# Patient Record
Sex: MALE
[De-identification: names, ages, dates, MRNs, and addresses within clinical notes are randomized per-mention and may not be internally consistent; named-entity substitution may affect disease eponyms.]

---

## 2017-04-09 ENCOUNTER — HOSPITAL ENCOUNTER (EMERGENCY)
Dept: HOSPITAL 5 - ED | Age: 62
Discharge: HOME | End: 2017-04-09
Payer: MEDICAID

## 2017-04-09 VITALS — DIASTOLIC BLOOD PRESSURE: 79 MMHG | SYSTOLIC BLOOD PRESSURE: 140 MMHG

## 2017-04-09 DIAGNOSIS — I10: ICD-10-CM

## 2017-04-09 DIAGNOSIS — G89.29: ICD-10-CM

## 2017-04-09 DIAGNOSIS — F32.9: ICD-10-CM

## 2017-04-09 DIAGNOSIS — F41.9: ICD-10-CM

## 2017-04-09 DIAGNOSIS — M19.90: ICD-10-CM

## 2017-04-09 DIAGNOSIS — M25.562: ICD-10-CM

## 2017-04-09 DIAGNOSIS — F43.10: ICD-10-CM

## 2017-04-09 DIAGNOSIS — M25.561: Primary | ICD-10-CM

## 2017-04-09 PROCEDURE — 72100 X-RAY EXAM L-S SPINE 2/3 VWS: CPT

## 2017-04-09 PROCEDURE — 99284 EMERGENCY DEPT VISIT MOD MDM: CPT

## 2017-04-09 NOTE — EMERGENCY DEPARTMENT REPORT
ED Lower Extremity HPI





- General


Chief Complaint: Extremity Injury, Lower


Stated Complaint: KNEE PAIN/BACK /HEADACHE


Time Seen by Provider: 04/09/17 15:21


Source: patient, EMS


Mode of arrival: Stretcher


Limitations: No Limitations





- History of Present Illness


MD Complaint: knee injury


Onset/Timing: 10


-: Gradual, year(s)


Injury: Knee: Right, Left


Type of Injury: hyperflexion


Place: home


Severity: moderate


Severity scale (0 -10): 5


Improves With: NSAID


Worsens With: weight bearing, movement


Associated Symptoms: able to partially bear weight, ambulatory.  denies: snap/

pop sensation, swelling, numbness, tingling


Treatments Prior to Arrival: cold therapy





- Related Data


 Home Medications











 Medication  Instructions  Recorded  Confirmed  Last Taken


 


diphenhydrAMINE [Benadryl] 100 mg PO PRN PRN 09/20/13 05/18/15 3 Days Ago


 


clonazePAM 2 mg PO BID 04/27/15 05/18/15 3 Days Ago


 


clonazePAM [KlonoPIN] 2 mg PO BID 04/27/15 05/18/15 3 Days Ago








 Previous Rx's











 Medication  Instructions  Recorded  Last Taken  Type


 


LORazepam [Ativan] 2 mg PO Q8HR #6 tablet 03/19/15 3 Days Ago Rx


 


Bupropion HCl [Wellbutrin XL] 300 mg PO QAM #30 tab.er.24h 03/29/15 3 Days Ago 

Rx


 


Venlafaxine Xr [Effexor XR] 150 mg PO ONCE #30 capsule 03/29/15 3 Days Ago Rx


 


Zolpidem [Ambien] 10 mg PO QHS #10 tablet 03/29/15 3 Days Ago Rx


 


traMADol [Ultram 50 MG tab] 50 mg PO Q6HR PRN #30 tablet 03/06/17 Unknown Rx


 


HYDROcodone/APAP  [Norco 1 each PO Q8HR PRN #15 tablet 04/09/17 Unknown Rx





 mg TAB]    











 Allergies











Allergy/AdvReac Type Severity Reaction Status Date / Time


 


buspirone HCl [From BuSpar] AdvReac  PSYCHOTIC Verified 03/06/17 17:18


 


olanzapine [From Zyprexa] AdvReac  PSYCHOTIC Verified 03/06/17 17:18


 


quetiapine fumarate AdvReac  PSYCHOTIC Verified 03/06/17 17:18





[From Seroquel]     














ED Review of Systems


ROS: 


Stated complaint: KNEE PAIN/BACK /HEADACHE


Other details as noted in HPI





Constitutional: denies: chills, fever


Eyes: denies: eye pain, eye discharge, vision change


ENT: denies: ear pain, throat pain


Respiratory: denies: cough, shortness of breath, wheezing


Cardiovascular: denies: chest pain, palpitations


Endocrine: no symptoms reported


Gastrointestinal: denies: abdominal pain, nausea, diarrhea


Genitourinary: denies: urgency, dysuria


Musculoskeletal: denies: back pain, joint swelling, arthralgia


Skin: denies: rash, lesions


Neurological: denies: headache, weakness, paresthesias


Psychiatric: denies: anxiety, depression


Hematological/Lymphatic: denies: easy bleeding, easy bruising





ED Past Medical Hx





- Past Medical History


Hx Hypertension: Yes


Hx Arthritis: Yes


Hx Psychiatric Treatment: Yes (anxiety, ptsd, depression)


Additional medical history: CHRONIC BACK PAIN.  HARD OF HEARING.  BULGING DISCS





- Surgical History


Additional Surgical History: L leg surgery (1995)





- Social History


Smoking Status: Never Smoker


Substance Use Type: None





- Medications


Home Medications: 


 Home Medications











 Medication  Instructions  Recorded  Confirmed  Last Taken  Type


 


diphenhydrAMINE [Benadryl] 100 mg PO PRN PRN 09/20/13 05/18/15 3 Days Ago 

History


 


LORazepam [Ativan] 2 mg PO Q8HR #6 tablet 03/19/15 05/18/15 3 Days Ago Rx


 


Bupropion HCl [Wellbutrin XL] 300 mg PO QAM #30 tab.er.24h 03/29/15 05/18/15 3 

Days Ago Rx


 


Venlafaxine Xr [Effexor XR] 150 mg PO ONCE #30 capsule 03/29/15 05/18/15 3 Days 

Ago Rx


 


Zolpidem [Ambien] 10 mg PO QHS #10 tablet 03/29/15 05/18/15 3 Days Ago Rx


 


clonazePAM 2 mg PO BID 04/27/15 05/18/15 3 Days Ago History


 


clonazePAM [KlonoPIN] 2 mg PO BID 04/27/15 05/18/15 3 Days Ago History


 


traMADol [Ultram 50 MG tab] 50 mg PO Q6HR PRN #30 tablet 03/06/17  Unknown Rx


 


HYDROcodone/APAP  [Norco 1 each PO Q8HR PRN #15 tablet 04/09/17  Unknown 

Rx





 mg TAB]     














ED Physical Exam





- General


Limitations: No Limitations


General appearance: alert, in no apparent distress





- Head


Head exam: Present: atraumatic, normocephalic





- Eye


Eye exam: Present: normal appearance





- ENT


ENT exam: Present: mucous membranes moist





- Neck


Neck exam: Present: normal inspection





- Respiratory


Respiratory exam: Present: normal lung sounds bilaterally.  Absent: respiratory 

distress





- Cardiovascular


Cardiovascular Exam: Present: regular rate, normal rhythm.  Absent: systolic 

murmur, diastolic murmur, rubs, gallop





- GI/Abdominal


GI/Abdominal exam: Present: soft, normal bowel sounds





- Rectal


Rectal exam: Present: deferred





- Extremities Exam


Extremities exam: Present: normal inspection, full ROM, tenderness (bilateral 

patella tendon tenderness , good rom but limited due to pain , able to ambulate 

here in the ER), normal capillary refill.  Absent: pedal edema, joint swelling, 

calf tenderness





- Back Exam


Back exam: Present: normal inspection





- Neurological Exam


Neurological exam: Present: alert, oriented X3





- Psychiatric


Psychiatric exam: Present: normal affect, normal mood





- Skin


Skin exam: Present: warm, dry, intact, normal color.  Absent: rash





ED Course


 Vital Signs











  04/09/17 04/09/17





  14:58 15:54


 


Temperature 98.3 F 


 


Pulse Rate 88 


 


Respiratory 16 20





Rate  


 


Blood Pressure 140/79 


 


O2 Sat by Pulse 99 





Oximetry  











Critical care attestation.: 


If time is entered above; I have spent that time in minutes in the direct care 

of this critically ill patient, excluding procedure time.








ED Disposition


Clinical Impression: 


 Knee pain





Disposition: DISCHARGED TO HOME OR SELFCARE


Is pt being admited?: No


Does the pt Need Aspirin: No


Condition: Good


Instructions:  Lumbar Radiculopathy (ED), Arthralgia (ED)


Prescriptions: 


HYDROcodone/APAP  [Norco  mg TAB] 1 each PO Q8HR PRN #15 tablet


 PRN Reason: Pain


Referrals: 


[facility]ARE,MD [Primary Care Provider] - 3-5 Days


LALITA ROSARIO MD [Staff Physician] - 3-5 Days


Time of Disposition: 17:36

## 2017-04-10 NOTE — XRAY REPORT
BILATERAL KNEES, 3 VIEWS:



History: Bilateral knee pain after fall.



Findings: Normal bone mineralization. No acute osseous injury is 

appreciated. The joint space is within normal limits. No joint 

effusions are identified.



Within the right knee, there is an approximate 3 mm radiodensity in the 

anterior soft tissues just superior to the patella. This could 

represent a foreign body. Please correlate with the patient and the 

image.



Impression:

Unremarkable bilateral knees.

Possible soft tissue foreign body as described above. It is unclear if 

this is acute or chronic.

## 2017-04-10 NOTE — XRAY REPORT
LUMBOSACRAL SPINE, 3 VIEWS:



History: Back pain



Findings: The vertebral bodies, disk spaces and posterior elements are 

intact.  No compression deformity or malalignment. Mild degenerative 

disc disease and facet arthropathy are identified at all levels. The SI 

joints are symmetric and unremarkable.



Impression:

1.  No evidence for acute injury to the lumbar spine.

## 2017-04-16 ENCOUNTER — HOSPITAL ENCOUNTER (EMERGENCY)
Dept: HOSPITAL 5 - ED | Age: 62
Discharge: HOME | End: 2017-04-16
Payer: MEDICAID

## 2017-04-16 VITALS — DIASTOLIC BLOOD PRESSURE: 105 MMHG | SYSTOLIC BLOOD PRESSURE: 144 MMHG

## 2017-04-16 DIAGNOSIS — F17.200: ICD-10-CM

## 2017-04-16 DIAGNOSIS — F32.9: ICD-10-CM

## 2017-04-16 DIAGNOSIS — I10: ICD-10-CM

## 2017-04-16 DIAGNOSIS — F41.9: ICD-10-CM

## 2017-04-16 DIAGNOSIS — M19.90: ICD-10-CM

## 2017-04-16 DIAGNOSIS — M54.41: Primary | ICD-10-CM

## 2017-04-16 DIAGNOSIS — G89.29: ICD-10-CM

## 2017-04-16 LAB
BILIRUB UR QL STRIP: (no result)
BLOOD UR QL VISUAL: (no result)
KETONES UR STRIP-MCNC: (no result) MG/DL
LEUKOCYTE ESTERASE UR QL STRIP: (no result)
MUCOUS THREADS #/AREA URNS HPF: (no result) /HPF
NITRITE UR QL STRIP: (no result)
PH UR STRIP: 7 [PH] (ref 5–7)
PROT UR STRIP-MCNC: (no result) MG/DL
RBC #/AREA URNS HPF: 3 /HPF (ref 0–6)
UROBILINOGEN UR-MCNC: 4 MG/DL (ref ?–2)
WBC #/AREA URNS HPF: 1 /HPF (ref 0–6)

## 2017-04-16 PROCEDURE — 99283 EMERGENCY DEPT VISIT LOW MDM: CPT

## 2017-04-16 PROCEDURE — 81001 URINALYSIS AUTO W/SCOPE: CPT

## 2017-04-16 NOTE — EMERGENCY DEPARTMENT REPORT
HPI





- General


Chief Complaint: Extremity Problem,Nontraumatic


Time Seen by Provider: 04/16/17 11:08





- HPI


HPI: 





61-year-old male with history of chronic back pain presents today with 

worsening lower back pain radiating down his leg.  Patient was seen here last 

week for similar symptoms, an x-ray was performed and revealed no acute 

findings.  Patient is also complaining of numbness going down his right leg.  

Patient was unable to follow up with orthopedic due to them not accepting his 

medicaid.  Denies any new or worsening symptoms.  Denies bowel or bladder 

incontinence.  Patient does complain of weak stream and dribbling post 

urination 2 weeks.  Denies fever, chills, nausea, vomiting, chest pain, 

shortness of breath, abdominal pain.





ED Past Medical Hx





- Past Medical History


Previous Medical History?: Yes


Hx Hypertension: Yes


Hx Arthritis: Yes


Hx Psychiatric Treatment: Yes (anxiety, ptsd, depression)


Additional medical history: CHRONIC BACK PAIN.  HARD OF HEARING.  BULGING DISCS





- Surgical History


Past Surgical History?: Yes


Additional Surgical History: L leg surgery (1995)





- Social History


Smoking Status: Current Every Day Smoker


Substance Use Type: Non Opiate Pain, Prescribed





- Medications


Home Medications: 


 Home Medications











 Medication  Instructions  Recorded  Confirmed  Last Taken  Type


 


diphenhydrAMINE [Benadryl] 100 mg PO PRN PRN 09/20/13 05/18/15 3 Days Ago 

History


 


LORazepam [Ativan] 2 mg PO Q8HR #6 tablet 03/19/15 05/18/15 3 Days Ago Rx


 


Bupropion HCl [Wellbutrin XL] 300 mg PO QAM #30 tab.er.24h 03/29/15 05/18/15 3 

Days Ago Rx


 


Venlafaxine Xr [Effexor XR] 150 mg PO ONCE #30 capsule 03/29/15 05/18/15 3 Days 

Ago Rx


 


Zolpidem [Ambien] 10 mg PO QHS #10 tablet 03/29/15 05/18/15 3 Days Ago Rx


 


clonazePAM 2 mg PO BID 04/27/15 05/18/15 3 Days Ago History


 


clonazePAM [KlonoPIN] 2 mg PO BID 04/27/15 05/18/15 3 Days Ago History


 


traMADol [Ultram 50 MG tab] 50 mg PO Q6HR PRN #30 tablet 03/06/17  Unknown Rx


 


Cyclobenzaprine [Flexeril] 10 mg PO TID PRN #20 tablet 04/16/17  Unknown Rx


 


HYDROcodone/APAP  [Norco 1 each PO Q8HR PRN #7 tablet 04/16/17  Unknown Rx





 mg TAB]     


 


Ketorolac [Toradol] 10 mg PO Q6H PRN #20 tablet 04/16/17  Unknown Rx














ED Review of Systems


ROS: 


Stated complaint: NUMBNESS IN BOTH LEGS


Other details as noted in HPI





Constitutional: denies: chills, fever, malaise


Eyes: denies: eye pain


ENT: denies: ear pain, throat pain, congestion


Respiratory: denies: cough, shortness of breath, wheezing


Cardiovascular: denies: chest pain, palpitations


Endocrine: no symptoms reported


Gastrointestinal: denies: abdominal pain, nausea, vomiting


Genitourinary: other (weak stream and dribbling post urination).  denies: 

urgency, dysuria, frequency, hematuria


Musculoskeletal: back pain


Neurological: numbness.  denies: headache, weakness





Physical Exam





- Physical Exam


Vital Signs: 


 Vital Signs











  04/16/17





  10:39


 


Temperature 98.6 F


 


Pulse Rate 98 H


 


Respiratory 20





Rate 


 


Blood Pressure 144/105


 


O2 Sat by Pulse 100





Oximetry 











Physical Exam: 





GENERAL: The patient is well-developed and well-nourished. Patient is in NAD. 





HEAD: Normocephalic.  Atraumatic.  





CHEST/LUNGS: Clear to auscultation throughout. 





HEART/CARDIOVASCULAR: Regular rate and rhythm.  No murmurs, rubs or gallops.





ABDOMEN: Abdomen is soft, nontender. Bowel sounds normoactive. No guarding or 

rebound tenderness. 





EXTREMITIES: Full range of motion.  Peripheral pulses intact. Capillary refill 

less than 2 seconds. 





BACK: Full ROM.  Positive for midline and bilateral paraspinal tenderness of 

the lumbar region.  Positive for tenderness to palpation of right sciatic 

notch.  Negative straight leg raise bilaterally.





NEURO: Alert and oriented x 3. Normal gait.











ED Course


 Vital Signs











  04/16/17





  10:39


 


Temperature 98.6 F


 


Pulse Rate 98 H


 


Respiratory 20





Rate 


 


Blood Pressure 144/105


 


O2 Sat by Pulse 100





Oximetry 














ED Medical Decision Making





- Lab Data





 Vital Signs











  04/16/17





  10:39


 


Temperature 98.6 F


 


Pulse Rate 98 H


 


Respiratory 20





Rate 


 


Blood Pressure 144/105


 


O2 Sat by Pulse 100





Oximetry 








 Lab Results











  04/16/17 Range/Units





  11:30 


 


Urine Color  Yellow  (Yellow)  


 


Urine Turbidity  Clear  (Clear)  


 


Urine pH  7.0  (5.0-7.0)  


 


Ur Specific Gravity  1.014  (1.003-1.030)  


 


Urine Protein  <15 mg/dl  (Negative)  mg/dL


 


Urine Glucose (UA)  Neg  (Negative)  mg/dL


 


Urine Ketones  Neg  (Negative)  mg/dL


 


Urine Blood  Neg  (Negative)  


 


Urine Nitrite  Neg  (Negative)  


 


Urine Bilirubin  Neg  (Negative)  


 


Urine Urobilinogen  4.0  (<2.0)  mg/dL


 


Ur Leukocyte Esterase  Neg  (Negative)  


 


Urine WBC (Auto)  1.0  (0.0-6.0)  /HPF


 


Urine RBC (Auto)  3.0  (0.0-6.0)  /HPF


 


U Epithel Cells (Auto)  < 1.0  (0-13.0)  /HPF


 


Calcium Oxalate Crystal  1+  


 


Urine Mucus  Few  /HPF














- Medical Decision Making





61-year-old male presents today with history of chronic back pain and sciatica 

like symptoms.  Patient will be provided with a referral for orthopedic. 

Patient is in no acute distress at this time.  He will be discharged home and 

is encouraged to follow up with a primary care provider.  He will be sent home 

on Flexeril and tramadol and is encouraged to return to the emergency room for 

any worsening symptoms. 


Critical care attestation.: 


If time is entered above; I have spent that time in minutes in the direct care 

of this critically ill patient, excluding procedure time.








ED Disposition


Clinical Impression: 


Chronic back pain


Qualifiers:


 Back pain location: low back pain Back pain laterality: bilateral Sciatica 

presence: with sciatica Sciatica laterality: sciatica of right side Qualified 

Code(s): M54.41 - Lumbago with sciatica, right side; G89.29 - Other chronic pain





Disposition: DISCHARGED TO HOME OR SELFCARE


Is pt being admited?: No


Does the pt Need Aspirin: No


Condition: Stable


Instructions:  Chronic Back Pain (ED), Sciatica (ED)


Additional Instructions: 


Follow-up with primary care provider and orthopedic.  Return to the emergency 

department if symptoms worsen.


Prescriptions: 


Cyclobenzaprine [Flexeril] 10 mg PO TID PRN #20 tablet


 PRN Reason: Muscle Spasm


HYDROcodone/APAP  [Norco  mg TAB] 1 each PO Q8HR PRN #7 tablet


 PRN Reason: Pain


Ketorolac [Toradol] 10 mg PO Q6H PRN #20 tablet


 PRN Reason: Pain


Referrals: 


[facility] [Provider Group] - 3-5 Days


PRIMARY CARE,MD [Primary Care Provider] - 3-5 Days


TANYA BRANCH MD [Staff Physician] - 3-5 Days


SHAWNEE NIX MD [Staff Physician] - 3-5 Days


NADIA DOUGLAS MD [Staff Physician] - 3-5 Days


Forms:  Work/School Release Form(ED)


Time of Disposition: 13:03

## 2017-04-19 ENCOUNTER — HOSPITAL ENCOUNTER (EMERGENCY)
Dept: HOSPITAL 5 - ED | Age: 62
LOS: 1 days | Discharge: HOME | End: 2017-04-20
Payer: MEDICAID

## 2017-04-19 DIAGNOSIS — R45.851: Primary | ICD-10-CM

## 2017-04-19 DIAGNOSIS — F17.200: ICD-10-CM

## 2017-04-19 DIAGNOSIS — M54.40: ICD-10-CM

## 2017-04-19 DIAGNOSIS — G89.29: ICD-10-CM

## 2017-04-19 DIAGNOSIS — I10: ICD-10-CM

## 2017-04-19 LAB
ALBUMIN SERPL-MCNC: 3.8 G/DL (ref 3.9–5)
ALBUMIN/GLOB SERPL: 1.6 %
ALP SERPL-CCNC: 46 UNITS/L (ref 35–129)
ALT SERPL-CCNC: 11 UNITS/L (ref 7–56)
ANION GAP SERPL CALC-SCNC: 14 MMOL/L
BASOPHILS NFR BLD AUTO: 0.8 % (ref 0–1.8)
BILIRUB SERPL-MCNC: 0.2 MG/DL (ref 0.1–1.2)
BUN SERPL-MCNC: 12 MG/DL (ref 9–20)
BUN/CREAT SERPL: 10 %
CALCIUM SERPL-MCNC: 8.5 MG/DL (ref 8.4–10.2)
CHLORIDE SERPL-SCNC: 104.4 MMOL/L (ref 98–107)
CO2 SERPL-SCNC: 27 MMOL/L (ref 22–30)
EOSINOPHIL NFR BLD AUTO: 1.9 % (ref 0–4.3)
GLUCOSE SERPL-MCNC: 102 MG/DL (ref 75–100)
HCT VFR BLD CALC: 37.1 % (ref 35.5–45.6)
HGB BLD-MCNC: 12.3 GM/DL (ref 11.8–15.2)
MCH RBC QN AUTO: 27 PG (ref 28–32)
MCHC RBC AUTO-ENTMCNC: 33 % (ref 32–34)
MCV RBC AUTO: 82 FL (ref 84–94)
PLATELET # BLD: 222 K/MM3 (ref 140–440)
POTASSIUM SERPL-SCNC: 4.5 MMOL/L (ref 3.6–5)
PROT SERPL-MCNC: 6.2 G/DL (ref 6.3–8.2)
RBC # BLD AUTO: 4.53 M/MM3 (ref 3.65–5.03)
SODIUM SERPL-SCNC: 141 MMOL/L (ref 137–145)
WBC # BLD AUTO: 6.4 K/MM3 (ref 4.5–11)

## 2017-04-19 PROCEDURE — 80053 COMPREHEN METABOLIC PANEL: CPT

## 2017-04-19 PROCEDURE — G0480 DRUG TEST DEF 1-7 CLASSES: HCPCS

## 2017-04-19 PROCEDURE — 99285 EMERGENCY DEPT VISIT HI MDM: CPT

## 2017-04-19 PROCEDURE — 36415 COLL VENOUS BLD VENIPUNCTURE: CPT

## 2017-04-19 PROCEDURE — 93010 ELECTROCARDIOGRAM REPORT: CPT

## 2017-04-19 PROCEDURE — 80307 DRUG TEST PRSMV CHEM ANLYZR: CPT

## 2017-04-19 PROCEDURE — 81001 URINALYSIS AUTO W/SCOPE: CPT

## 2017-04-19 PROCEDURE — 85025 COMPLETE CBC W/AUTO DIFF WBC: CPT

## 2017-04-19 PROCEDURE — 93005 ELECTROCARDIOGRAM TRACING: CPT

## 2017-04-19 PROCEDURE — 80320 DRUG SCREEN QUANTALCOHOLS: CPT

## 2017-04-19 RX ADMIN — DIAZEPAM ONE MG: 5 TABLET ORAL at 21:25

## 2017-04-19 NOTE — EMERGENCY DEPARTMENT REPORT
<EDWARD REEVES - Last Filed: 17 17:26>





ED General Adult HPI





- General


Chief complaint: Recheck/Abnormal Lab/Rx


Stated complaint: MED REFILL


Time Seen by Provider: 17 16:57


Source: patient


Mode of arrival: Ambulatory


Limitations: No Limitations





- History of Present Illness


Initial comments: 





PT presents to the ER the third time this month for back pain.  PT states he 

does not have the right insurance to see ortho.  PT states his PCP is "no good" 

and will only give him RXs for lisinopril.  PT states he is on Klonopin for SZ 

disorder and PTSD.  PT states he needs RX for Ativan, Tylenol #3, and Ambien. 





PT reports having chronic back pain.  PT also states that he does not sleep 

well at night. PT states he will sleep for 2 hours at a time. 


MD Complaint: Medication Refill 


Location: back


Severity scale (0 -10): 9


Quality: constant


Consistency: constant


Improves with: medication (Norco/Flexeril/Ultram/Tylenol #3/ Ativan/Ambien)


Associated Symptoms: denies: fever/chills, nausea/vomiting


Treatments Prior to Arrival: NSAID





- Related Data


 Home Medications











 Medication  Instructions  Recorded  Confirmed  Last Taken


 


diphenhydrAMINE [Benadryl] 100 mg PO PRN PRN 09/20/13 05/18/15 3 Days Ago


 


clonazePAM 2 mg PO BID 04/27/15 05/18/15 3 Days Ago


 


clonazePAM [KlonoPIN] 2 mg PO BID 04/27/15 05/18/15 3 Days Ago








 Previous Rx's











 Medication  Instructions  Recorded  Last Taken  Type


 


LORazepam [Ativan] 2 mg PO Q8HR #6 tablet 03/19/15 3 Days Ago Rx


 


Bupropion HCl [Wellbutrin XL] 300 mg PO QAM #30 tab.er.24h 03/29/15 3 Days Ago 

Rx


 


Venlafaxine Xr [Effexor XR] 150 mg PO ONCE #30 capsule 03/29/15 3 Days Ago Rx


 


Zolpidem [Ambien] 10 mg PO QHS #10 tablet 03/29/15 3 Days Ago Rx


 


traMADol [Ultram 50 MG tab] 50 mg PO Q6HR PRN #30 tablet 17 Unknown Rx


 


Cyclobenzaprine [Flexeril] 10 mg PO TID PRN #20 tablet 17 Unknown Rx


 


HYDROcodone/APAP  [Norco 1 each PO Q8HR PRN #7 tablet 17 Unknown Rx





 mg TAB]    


 


Ketorolac [Toradol] 10 mg PO Q6H PRN #20 tablet 17 Unknown Rx











 Allergies











Allergy/AdvReac Type Severity Reaction Status Date / Time


 


buspirone HCl [From BuSpar] AdvReac  PSYCHOTIC Verified 17 13:41


 


haloperidol [From Haldol] AdvReac  HANDS Verified 17 13:41





   SWELL /  





   PSYCHOTIC  


 


haloperidol lactate AdvReac  HANDS Verified 17 13:41





[From Haldol]   SWELL /  





   PSYCHOTIC  


 


olanzapine [From Zyprexa] AdvReac  PSYCHOTIC Verified 17 13:41


 


quetiapine fumarate AdvReac  PSYCHOTIC Verified 17 13:41





[From Seroquel]     














ED Review of Systems


ROS: 


Stated complaint: MED REFILL


Other details as noted in HPI





Comment: All other systems reviewed and negative


Constitutional: denies: chills, fever


Gastrointestinal: denies: abdominal pain


Musculoskeletal: back pain (radiates down legs ), other (cookie knee pain/ 

grinding )


Neurological: numbness (down legs )


Psychiatric: anxiety, other (unable to sleep)





ED Past Medical Hx





- Past Medical History


Hx Hypertension: Yes


Hx Arthritis: Yes


Hx Psychiatric Treatment: Yes (anxiety, ptsd, depression)


Additional medical history: CHRONIC BACK PAIN.  HARD OF HEARING.  BULGING DISCS





- Surgical History


Additional Surgical History: L leg surgery ()





- Social History


Smoking Status: Light Tobacco Smoker


Substance Use Type: Prescribed





- Medications


Home Medications: 


 Home Medications











 Medication  Instructions  Recorded  Confirmed  Last Taken  Type


 


diphenhydrAMINE [Benadryl] 100 mg PO PRN PRN 09/20/13 05/18/15 3 Days Ago 

History


 


LORazepam [Ativan] 2 mg PO Q8HR #6 tablet 03/19/15 05/18/15 3 Days Ago Rx


 


Bupropion HCl [Wellbutrin XL] 300 mg PO QAM #30 tab.er.24h 03/29/15 05/18/15 3 

Days Ago Rx


 


Venlafaxine Xr [Effexor XR] 150 mg PO ONCE #30 capsule 03/29/15 05/18/15 3 Days 

Ago Rx


 


Zolpidem [Ambien] 10 mg PO QHS #10 tablet 03/29/15 05/18/15 3 Days Ago Rx


 


clonazePAM 2 mg PO BID 04/27/15 05/18/15 3 Days Ago History


 


clonazePAM [KlonoPIN] 2 mg PO BID 04/27/15 05/18/15 3 Days Ago History


 


traMADol [Ultram 50 MG tab] 50 mg PO Q6HR PRN #30 tablet 17  Unknown Rx


 


Cyclobenzaprine [Flexeril] 10 mg PO TID PRN #20 tablet 17  Unknown Rx


 


HYDROcodone/APAP  [Norco 1 each PO Q8HR PRN #7 tablet 17  Unknown Rx





 mg TAB]     


 


Ketorolac [Toradol] 10 mg PO Q6H PRN #20 tablet 17  Unknown Rx














ED Physical Exam





- General


Limitations: No Limitations


General appearance: alert, in no apparent distress





- Head


Head exam: Present: atraumatic, normocephalic, normal inspection





- Eye


Eye exam: Present: normal appearance.  Absent: conjunctival injection





- ENT


ENT exam: Present: other (PT hard of hearing )





- Neck


Neck exam: Present: normal inspection, full ROM.  Absent: tenderness





- Respiratory


Respiratory exam: Present: other (diminished cookie ).  Absent: respiratory 

distress, wheezes, rhonchi





- Cardiovascular


Cardiovascular Exam: Present: regular rate, normal rhythm, normal heart sounds





- GI/Abdominal


GI/Abdominal exam: Present: soft.  Absent: tenderness





- Extremities Exam


Extremities exam: Present: normal inspection.  Absent: pedal edema





- Back Exam


Back exam: Present: normal inspection, tenderness, paraspinal tenderness, 

vertebral tenderness.  Absent: CVA tenderness (R), CVA tenderness (L)





- Neurological Exam


Neurological exam: Present: alert, oriented X3, normal gait





- Psychiatric


Psychiatric exam: Present: normal affect, normal mood





- Skin


Skin exam: Present: warm, dry, intact





ED Course


 Vital Signs











  17





  13:34 00:07 01:44


 


Temperature 98.2 F 97.8 F 


 


Pulse Rate 96 H 64 


 


Respiratory 17 20 18





Rate   


 


Blood Pressure 154/99  


 


Blood Pressure  137/77 





[Left]   


 


O2 Sat by Pulse 98 96 96





Oximetry   














- Reevaluation(s)


Reevaluation #1: 





17 17:47


Spoke with Dr Floyd regarding pt's requests.  PT aware that we will not be able 

to prescribe Tylenol #3, Ambien, and Ativan.  PT states he will just "go home 

and kill [himself]"  PT states he can not live with this pain.  PT aware a 10-

13 will be signed.  





- Pulse Oximetry Interpretation


  ** Digit-Finger


Initial Pulse Oximetry Readin


Actions Taken: none





ED Medical Decision Making





- Differential Diagnosis


chronic pain, si, drug seeking behavior 


Critical care attestation.: 


If time is entered above; I have spent that time in minutes in the direct care 

of this critically ill patient, excluding procedure time.








ED Disposition


Clinical Impression: 


 Medical clearance for psychiatric admission, Suicidal ideation





Chronic back pain


Qualifiers:


 Back pain location: low back pain Back pain laterality: unspecified Sciatica 

presence: with sciatica Sciatica laterality: sciatica laterality unspecified 

Qualified Code(s): M54.40 - Lumbago with sciatica, unspecified side; G89.29 - 

Other chronic pain





Depression


Qualifiers:


 Depression Type: unspecified Qualified Code(s): F32.9 - Major depressive 

disorder, single episode, unspecified





Disposition: DC/TX PSY HOSP/PSY UNIT


Condition: Serious


Referrals: 


[facility] [Primary Care Provider] - 3-5 Days





<KEISHA POP - Last Filed: 17 01:49>





ED Medical Decision Making





- Lab Data


Result diagrams: 


 17 20:15





 17 20:15





- Medical Decision Making





Patient is 61-year-old male with history of hypertension, PTSD, bipolar affect 

disorder presenting today because of worsening lower back pain and lateral knee 

pain.  Patient has a known history of chronic pain in these locations.  Is out 

of his medications which includes Ativan and narcotic pain medication.  When 

questioned further he states that he has been suicidal in the recent past 

because of his pain.  He has attempted suicide in the past with an overdose 

requiring an ICU admission.  His sister also committed suicide successfully in 

the past.





His exam was unremarkable other than depressed affect





Given patient's concerning history of psychiatric disorder and suicidal attempt


We'll place on 1013





ED Disposition


Is pt being admited?: No

## 2017-04-20 VITALS — SYSTOLIC BLOOD PRESSURE: 148 MMHG | DIASTOLIC BLOOD PRESSURE: 90 MMHG

## 2017-04-20 LAB
BILIRUB UR QL STRIP: (no result)
BLOOD UR QL VISUAL: (no result)
KETONES UR STRIP-MCNC: (no result) MG/DL
LEUKOCYTE ESTERASE UR QL STRIP: (no result)
MUCOUS THREADS #/AREA URNS HPF: (no result) /HPF
NITRITE UR QL STRIP: (no result)
PH UR STRIP: 5 [PH] (ref 5–7)
PROT UR STRIP-MCNC: (no result) MG/DL
RBC #/AREA URNS HPF: 3 /HPF (ref 0–6)
URINE DRUGS OF ABUSE NOTE: (no result)
UROBILINOGEN UR-MCNC: 4 MG/DL (ref ?–2)
WBC #/AREA URNS HPF: 2 /HPF (ref 0–6)

## 2017-04-20 NOTE — EMERGENCY DEPARTMENT REPORT
Blank Doc





- Documentation


Documentation: 





Patient has been evaluated by mental health and cleared for discharge.  Form 

1013 has been rescinded by Dr. Deshpande, psychiatry.  Patient will be discharged 

home at this time without patient referral at the [facility].

## 2017-04-20 NOTE — CONSULTATION
History of Present Illness





- Reason for Consult


Consult date: 04/20/17


Reason for consult: Mental Health Evaluation


Requesting physician: KEISHA POP





- Chief Complaint


Chief complaint: 


"I am not suicidal"








- History of Present Psychiatric Illness


61 y.o white male presents to the ER the third time this month for back pain. 

Today patient is calm and cooperative with a linear thought process. He stated 

that he was suicidal because he wanted pain medication for his lower back on 

admission. He denies being SI/HI's and AVH's. He would like information about a 

pain specialist in his local area. Patient lives 20 mins walking distance from 

[facility]. He stated that he has psy medications at home that he can take, but would 

like information about local outpatient psy services. He states that he slept 

well last night with no complaints of a poor appetite. He denies recreational 

drug use or consumption of alcohol. He stated that he battles with PTSD from 

multiple deaths in his family. His major concern is back pain. 





Medications and Allergies


 Allergies











Allergy/AdvReac Type Severity Reaction Status Date / Time


 


buspirone HCl [From BuSpar] AdvReac  PSYCHOTIC Verified 04/19/17 13:41


 


haloperidol [From Haldol] AdvReac  HANDS Verified 04/19/17 13:41





   SWELL /  





   PSYCHOTIC  


 


haloperidol lactate AdvReac  HANDS Verified 04/19/17 13:41





[From Haldol]   SWELL /  





   PSYCHOTIC  


 


olanzapine [From Zyprexa] AdvReac  PSYCHOTIC Verified 04/19/17 13:41


 


quetiapine fumarate AdvReac  PSYCHOTIC Verified 04/19/17 13:41





[From Seroquel]     











 Home Medications











 Medication  Instructions  Recorded  Confirmed  Last Taken  Type


 


diphenhydrAMINE [Benadryl] 100 mg PO PRN PRN 09/20/13 04/20/17 3 Days Ago 

History


 


LORazepam [Ativan] 2 mg PO Q8HR #6 tablet 03/19/15 04/20/17 3 Days Ago Rx


 


Bupropion HCl [Wellbutrin XL] 300 mg PO QAM #30 tab.er.24h 03/29/15 04/20/17 3 

Days Ago Rx


 


Venlafaxine Xr [Effexor XR] 150 mg PO ONCE #30 capsule 03/29/15 04/20/17 3 Days 

Ago Rx


 


Zolpidem [Ambien] 10 mg PO QHS #10 tablet 03/29/15 04/20/17 3 Days Ago Rx


 


clonazePAM 2 mg PO BID 04/27/15 04/20/17 3 Days Ago History


 


clonazePAM [KlonoPIN] 2 mg PO BID 04/27/15 04/20/17 3 Days Ago History


 


traMADol [Ultram 50 MG tab] 50 mg PO Q6HR PRN #30 tablet 03/06/17 04/20/17 

Unknown Rx


 


Cyclobenzaprine [Flexeril] 10 mg PO TID PRN #20 tablet 04/16/17 04/20/17 

Unknown Rx


 


HYDROcodone/APAP  [Norco 1 each PO Q8HR PRN #7 tablet 04/16/17 04/20/17 

Unknown Rx





 mg TAB]     


 


Ketorolac [Toradol] 10 mg PO Q6H PRN #20 tablet 04/16/17 04/20/17 Unknown Rx














Past psychiatric history





- Past Medical History


Past Medical History: arthritis, hypertension


Past Surgical History: No surgical history





- past Psychiatric treatment and history


Psych: Bipolar, Depression


psychiatric treatment history: 


multiple inpatient psy facilities. Denies fam psy hx.








- Social History


Social history: Lives alone (HS graduate), other





Mental Status Exam





- Vital signs


 Last Vital Signs











Temp  97.7 F   04/20/17 09:31


 


Pulse  79   04/20/17 09:31


 


Resp  16   04/20/17 09:32


 


BP  148/90   04/20/17 09:31


 


Pulse Ox  100   04/20/17 09:32














- Exam


Narrative exam: 


ROS (-) depression (-) delusional





MSE:





 Appearance: calm, cooperative 


 Behavior: good eye contact 


 Speech: regular rate and tone 


 Mood: "I am not depressed" 


 Affect: flat


 Thought Process: linear  


 Thought Content: denies SI/HI's and AVH's 


 Motor Activity:


 Cognition: A/Ox4


 Insight: fair


 Judgment: fair








Results


Result Diagrams: 


 04/19/17 20:15





 04/19/17 20:15


 Abnormal lab results











  04/19/17 04/19/17 04/19/17 Range/Units





  20:15 20:15 20:15 


 


MCV  82 L    (84-94)  fl


 


MCH  27 L    (28-32)  pg


 


Mono % (Auto)  8.2 H    (0.0-7.3)  %


 


Glucose   102 H   ()  mg/dL


 


Total Protein   6.2 L   (6.3-8.2)  g/dL


 


Albumin   3.8 L   (3.9-5)  g/dL


 


Salicylates    < 0.3 L  (2.8-20.0)  mg/dL








All other labs normal.








Assessment and Plan


Assessment and plan: 


Impression: Somatic Symptom DO. 61 y.o white male presents to the ER the third 

time this month for back pain. Today patient is calm and cooperative with a 

linear thought process. He stated that he was suicidal because he wanted pain 

medication for his lower back on admission. He denies SI/HI's and AVH's. 

Patient is no threat to self or anyone else.





DD: Mood DO NOS





Recommendation: Rescind 1013. Provide him with [facility] information for psy 

services.

## 2017-04-24 ENCOUNTER — HOSPITAL ENCOUNTER (EMERGENCY)
Dept: HOSPITAL 5 - ED | Age: 62
LOS: 1 days | Discharge: HOME | End: 2017-04-25
Payer: MEDICAID

## 2017-04-24 DIAGNOSIS — Z88.8: ICD-10-CM

## 2017-04-24 DIAGNOSIS — F41.0: ICD-10-CM

## 2017-04-24 DIAGNOSIS — M19.90: ICD-10-CM

## 2017-04-24 DIAGNOSIS — F32.9: ICD-10-CM

## 2017-04-24 DIAGNOSIS — G89.29: ICD-10-CM

## 2017-04-24 DIAGNOSIS — F41.9: ICD-10-CM

## 2017-04-24 DIAGNOSIS — R07.9: Primary | ICD-10-CM

## 2017-04-24 DIAGNOSIS — I10: ICD-10-CM

## 2017-04-24 LAB
ANION GAP SERPL CALC-SCNC: 19 MMOL/L
BASOPHILS NFR BLD AUTO: 0.7 % (ref 0–1.8)
BUN SERPL-MCNC: 15 MG/DL (ref 9–20)
BUN/CREAT SERPL: 13.63 %
CALCIUM SERPL-MCNC: 8.9 MG/DL (ref 8.4–10.2)
CHLORIDE SERPL-SCNC: 100 MMOL/L (ref 98–107)
CO2 SERPL-SCNC: 25 MMOL/L (ref 22–30)
EOSINOPHIL NFR BLD AUTO: 1.2 % (ref 0–4.3)
GLUCOSE SERPL-MCNC: 80 MG/DL (ref 75–100)
HCT VFR BLD CALC: 40.3 % (ref 35.5–45.6)
HGB BLD-MCNC: 13.3 GM/DL (ref 11.8–15.2)
MCH RBC QN AUTO: 27 PG (ref 28–32)
MCHC RBC AUTO-ENTMCNC: 33 % (ref 32–34)
MCV RBC AUTO: 81 FL (ref 84–94)
PLATELET # BLD: 231 K/MM3 (ref 140–440)
POTASSIUM SERPL-SCNC: 4.2 MMOL/L (ref 3.6–5)
RBC # BLD AUTO: 4.95 M/MM3 (ref 3.65–5.03)
SODIUM SERPL-SCNC: 140 MMOL/L (ref 137–145)
WBC # BLD AUTO: 7.9 K/MM3 (ref 4.5–11)

## 2017-04-24 PROCEDURE — 81001 URINALYSIS AUTO W/SCOPE: CPT

## 2017-04-24 PROCEDURE — 84484 ASSAY OF TROPONIN QUANT: CPT

## 2017-04-24 PROCEDURE — 85025 COMPLETE CBC W/AUTO DIFF WBC: CPT

## 2017-04-24 PROCEDURE — 71010: CPT

## 2017-04-24 PROCEDURE — 80048 BASIC METABOLIC PNL TOTAL CA: CPT

## 2017-04-24 PROCEDURE — 93005 ELECTROCARDIOGRAM TRACING: CPT

## 2017-04-24 PROCEDURE — 80307 DRUG TEST PRSMV CHEM ANLYZR: CPT

## 2017-04-24 PROCEDURE — 93010 ELECTROCARDIOGRAM REPORT: CPT

## 2017-04-24 PROCEDURE — 36415 COLL VENOUS BLD VENIPUNCTURE: CPT

## 2017-04-25 VITALS — DIASTOLIC BLOOD PRESSURE: 65 MMHG | SYSTOLIC BLOOD PRESSURE: 119 MMHG

## 2017-04-25 LAB
BILIRUB UR QL STRIP: (no result)
BLOOD UR QL VISUAL: (no result)
KETONES UR STRIP-MCNC: (no result) MG/DL
LEUKOCYTE ESTERASE UR QL STRIP: (no result)
NITRITE UR QL STRIP: (no result)
PH UR STRIP: 6 [PH] (ref 5–7)
PROT UR STRIP-MCNC: (no result) MG/DL
RBC #/AREA URNS HPF: 1 /HPF (ref 0–6)
URINE DRUGS OF ABUSE NOTE: (no result)
UROBILINOGEN UR-MCNC: 2 MG/DL (ref ?–2)
WBC #/AREA URNS HPF: < 1 /HPF (ref 0–6)

## 2017-04-25 NOTE — XRAY REPORT
AP CHEST: History: Chest pain.

AP view of the chest demonstrates a normal mediastinal and

cardiac contour with clear lungs and normal bony and soft tissue

structures.



IMPRESSION:

Normal AP chest.

## 2017-04-25 NOTE — EMERGENCY DEPARTMENT REPORT
HPI





- General


Chief Complaint: Chest Pain


Time Seen by Provider: 04/25/17 00:48





- HPI


HPI: 








The patient is a 61-year-old male who presents for evaluation of chest pain.  

The patient reports chest pain and AVM yesterday morning, 10/10 in severity, 

pounding in quality, constant for hours, and several resolving.  He reports 

associated transient shortness of breath and racing of the heart.  He states 

that his symptoms began while having a night terror and PTSD attack this 

morning while lying in bed. The patient denies fever, trauma to the chest, neck 

pain, parasthesias, current dyspnea, cough, hemoptysis, dizziness, syncope, 

unilateral leg swelling, calf muscle pain, history of DVT or PE, recent 

immobilization, recent surgery, or history of cancer.











ED Past Medical Hx





- Past Medical History


Hx Hypertension: Yes


Hx Arthritis: Yes


Hx Psychiatric Treatment: Yes (anxiety, ptsd, depression)


Additional medical history: CHRONIC BACK PAIN.  HARD OF HEARING.  BULGING DISCS





- Surgical History


Additional Surgical History: L leg surgery (1995)





- Social History


Smoking Status: Never Smoker


Substance Use Type: None





- Medications


Home Medications: 


 Home Medications











 Medication  Instructions  Recorded  Confirmed  Last Taken  Type


 


diphenhydrAMINE [Benadryl] 100 mg PO PRN PRN 09/20/13 04/20/17 3 Days Ago 

History


 


LORazepam [Ativan] 2 mg PO Q8HR #6 tablet 03/19/15 04/20/17 3 Days Ago Rx


 


Bupropion HCl [Wellbutrin XL] 300 mg PO QAM #30 tab.er.24h 03/29/15 04/20/17 3 

Days Ago Rx


 


Venlafaxine Xr [Effexor XR] 150 mg PO ONCE #30 capsule 03/29/15 04/20/17 3 Days 

Ago Rx


 


Zolpidem [Ambien] 10 mg PO QHS #10 tablet 03/29/15 04/20/17 3 Days Ago Rx


 


clonazePAM 2 mg PO BID 04/27/15 04/20/17 3 Days Ago History


 


clonazePAM [KlonoPIN] 2 mg PO BID 04/27/15 04/20/17 3 Days Ago History


 


traMADol [Ultram 50 MG tab] 50 mg PO Q6HR PRN #30 tablet 03/06/17 04/20/17 

Unknown Rx


 


Cyclobenzaprine [Flexeril] 10 mg PO TID PRN #20 tablet 04/16/17 04/20/17 

Unknown Rx


 


HYDROcodone/APAP  [Norco 1 each PO Q8HR PRN #7 tablet 04/16/17 04/20/17 

Unknown Rx





 mg TAB]     


 


Ketorolac [Toradol] 10 mg PO Q6H PRN #20 tablet 04/16/17 04/20/17 Unknown Rx














ED Review of Systems


ROS: 


Stated complaint: CP/SOB/DEPRESSION


Other details as noted in HPI








Constitutional: denies: fever


ENT: denies: throat or neck pain


Respiratory: denies: cough, shortness of breath


Cardiovascular: reports  chest pain


Endocrine: denies unexplained weight loss or gain


Gastrointestinal: denies: abdominal pain, nausea


Genitourinary: denies: dysuria


Musculoskeletal: denies: leg swelling


Skin: denies: rash


Neurological: denies: headache


Hematological/Lymphatic: denies: easy bleeding or easy bruising  


Psych: reports anxiousness, denies sadness or hopelessness








Physical Exam





- Physical Exam


Vital Signs: 


 Vital Signs











  04/24/17 04/24/17 04/24/17





  22:24 23:12 23:19


 


Temperature 98.4 F  


 


Pulse Rate 96 H 86 79


 


Respiratory 18 12 18





Rate   


 


Blood Pressure 141/91  


 


Blood Pressure   132/64





[Left]   


 


O2 Sat by Pulse 100 98 98





Oximetry   











Physical Exam: 








General: well-nourished, well-developed, no acute distress


Head: Normocephalic, atraumatic


Eyes: normal sclera


ENT: Mucous membranes are pink and moist 


Neck: trachea midline, neck supple, No neck stiffness, no cervical adenopathy


Respiratory: Breath sounds equal bilaterally, no wheezing, rales, or rhonchi


Cardio: S1 and S2 present, no murmurs, rubs, gallops, capillary refill is brisk


Abdomen: Normoactive bowel sounds, soft abdomen, no tenderness


Musc: No pitting edema


Skin: No rash


Neuro: no facial drooping, normal speech


Psych: Normal affect, patient anxious, no suicidal ideation, no hallucinations











ED Course


 Vital Signs











  04/24/17 04/24/17 04/24/17





  22:24 23:12 23:19


 


Temperature 98.4 F  


 


Pulse Rate 96 H 86 79


 


Respiratory 18 12 18





Rate   


 


Blood Pressure 141/91  


 


Blood Pressure   132/64





[Left]   


 


O2 Sat by Pulse 100 98 98





Oximetry   














ED Medical Decision Making





- Lab Data


Result diagrams: 


 04/24/17 22:50





 04/24/17 22:50





- Medical Decision Making





The patient was seen and examined by myself.  The patient is placed on a 

cardiac monitor and continuous pulse ox. On initial evaluation, the patient was 

found to be in no distress.  EKG was negative for findings suggestive of acute 

cardiac infarct. Labs and imaging are obtained.  The patient is given a tablet 

of Ativan for his anxiety and Tylenol for his pain. Chest x-ray is negative for 

pneumothorax, focal consolidation, pulmonary vascular congestion, pleural 

effusion, or other obvious acute cardiopulmonary disease process.  Lab results 

were non-concerning including levels of troponin, WBC, hemoglobin, hematocrit, 

electrolytes, renal function. The patient was reevaluated and reported that 

their symptoms were markedly improved.  As the patient has a KIRILL risk score 

less than 2, and a well's score less than 2, the patient is at low risk of ACS 

or pulmonary emboli etiology of their symptoms. 





The patient's symptoms are more consistent with panic attack. The patient is 

stable for discharge with outpatient follow-up.  The patient is given follow-up 

and return instructions.  The patient expressed understanding and agreed with 

the plan.  The patient is discharged in stable condition.





Critical care attestation.: 


If time is entered above; I have spent that time in minutes in the direct care 

of this critically ill patient, excluding procedure time.








ED Disposition


Clinical Impression: 


 Acute chest pain, Panic attack





Disposition: DISCHARGED TO HOME OR SELFCARE


Is pt being admited?: No


Does the pt Need Aspirin: No


Condition: Stable


Instructions:  Chest Pain (ED)


Referrals: 


PRIMARY CARE,MD [Primary Care Provider] - 3-5 Days


[facility] [Outside] - 3-5 Days


Time of Disposition: 01:49

## 2017-06-16 ENCOUNTER — HOSPITAL ENCOUNTER (EMERGENCY)
Dept: HOSPITAL 5 - ED | Age: 62
Discharge: HOME | End: 2017-06-16
Payer: MEDICAID

## 2017-06-16 VITALS — SYSTOLIC BLOOD PRESSURE: 165 MMHG | DIASTOLIC BLOOD PRESSURE: 83 MMHG

## 2017-06-16 DIAGNOSIS — S39.012A: Primary | ICD-10-CM

## 2017-06-16 DIAGNOSIS — M54.41: ICD-10-CM

## 2017-06-16 DIAGNOSIS — F41.9: ICD-10-CM
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
## 2017-06-16 DIAGNOSIS — M19.90: ICD-10-CM

## 2017-06-16 DIAGNOSIS — Z88.8: ICD-10-CM

## 2017-06-16 DIAGNOSIS — G89.29: ICD-10-CM

## 2017-06-16 DIAGNOSIS — F32.9: ICD-10-CM

## 2017-06-16 DIAGNOSIS — F17.210: ICD-10-CM

## 2017-06-16 DIAGNOSIS — I10: ICD-10-CM
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
## 2017-06-16 PROCEDURE — 96372 THER/PROPH/DIAG INJ SC/IM: CPT

## 2017-06-16 PROCEDURE — 72100 X-RAY EXAM L-S SPINE 2/3 VWS: CPT

## 2017-06-16 PROCEDURE — 99283 EMERGENCY DEPT VISIT LOW MDM: CPT

## 2017-06-16 NOTE — EMERGENCY DEPARTMENT REPORT
ED Back Pain/Injury HPI





- General


Chief Complaint: Fall


Stated Complaint: FALL/BACK PAIN/RT LEG PAIN


Time Seen by Provider: 17 20:18


Source: patient


Limitations: No Limitations





- History of Present Illness


MD Complaint: back pain, fall


Onset/Timin


-: Sudden, days(s)


Similar Symptoms Previously: Yes


Place: home


Radiation: right leg


Severity: moderate


Severity scale (0 -10): 4


Quality: sharp, aching


Consistency: intermittent


Improves With: other (rest )


Worsens With: movement, walking


Context: fall (fell from bench less than 2 feet )


Associated Symptoms: denies: weakness, numbness, difficulty urinating, 

incontinence, fever/chills


Treatments Prior to Arrival: other (none)





- Related Data


 Home Medications











 Medication  Instructions  Recorded  Confirmed  Last Taken


 


diphenhydrAMINE [Benadryl] 100 mg PO PRN PRN 13 3 Days Ago


 


clonazePAM [KlonoPIN] 2 mg PO BID 04/27/15 06/16/17 06/16/17








 Previous Rx's











 Medication  Instructions  Recorded  Last Taken  Type


 


Bupropion HCl [Wellbutrin XL] 300 mg PO QAM #30 tab.er.24h 03/29/15 3 Days Ago 

Rx


 


Venlafaxine Xr [Effexor XR] 150 mg PO ONCE #30 capsule 03/29/15 3 Days Ago Rx


 


Zolpidem [Ambien] 10 mg PO QHS #10 tablet 03/29/15 1 Day Ago Rx


 


traMADol [Ultram 50 MG tab] 50 mg PO Q6HR PRN #30 tablet 17 Unknown Rx


 


Cyclobenzaprine [Flexeril] 10 mg PO TID PRN #20 tablet 17 Unknown Rx


 


HYDROcodone/APAP  [Norco 1 each PO Q8HR PRN #7 tablet 17 Unknown Rx





 mg TAB]    


 


Ketorolac [Toradol] 10 mg PO Q6H PRN #20 tablet 17 Unknown Rx


 


Acetaminophen [Shake That Ache] 500 mg PO QID PRN #30 tablet 17 Unknown Rx


 


Cyclobenzaprine HCl [Flexeril 5 MG 5 mg PO BID PRN #30 tab 17 Unknown Rx





TAB]    











 Allergies











Allergy/AdvReac Type Severity Reaction Status Date / Time


 


buspirone HCl [From BuSpar] AdvReac  PSYCHOTIC Verified 17 13:41


 


haloperidol [From Haldol] AdvReac  HANDS Verified 17 13:41





   SWELL /  





   PSYCHOTIC  


 


haloperidol lactate AdvReac  HANDS Verified 17 13:41





[From Haldol]   SWELL /  





   PSYCHOTIC  


 


olanzapine [From Zyprexa] AdvReac  PSYCHOTIC Verified 17 13:41


 


quetiapine fumarate AdvReac  PSYCHOTIC Verified 17 13:41





[From Seroquel]     














ED Review of Systems


ROS: 


Stated complaint: FALL/BACK PAIN/RT LEG PAIN


Other details as noted in HPI





Constitutional: denies: chills, fever


Eyes: denies: eye pain, eye discharge, vision change


ENT: denies: ear pain, throat pain


Respiratory: denies: cough, shortness of breath, wheezing


Cardiovascular: denies: chest pain, palpitations


Endocrine: no symptoms reported


Gastrointestinal: denies: abdominal pain, nausea, diarrhea


Genitourinary: denies: urgency, dysuria


Musculoskeletal: back pain, arthralgia, myalgia


Skin: denies: rash, lesions


Neurological: denies: headache, weakness, paresthesias


Psychiatric: denies: anxiety, depression


Hematological/Lymphatic: denies: easy bleeding, easy bruising





ED Past Medical Hx





- Past Medical History


Hx Hypertension: Yes


Hx Arthritis: Yes


Hx Psychiatric Treatment: Yes (anxiety, ptsd, depression)


Additional medical history: CHRONIC BACK PAIN.  HARD OF HEARING.  BULGING DISCS





- Surgical History


Additional Surgical History: L leg surgery ()





- Social History


Smoking Status: Current Some Day Smoker


Substance Use Type: None





- Medications


Home Medications: 


 Home Medications











 Medication  Instructions  Recorded  Confirmed  Last Taken  Type


 


diphenhydrAMINE [Benadryl] 100 mg PO PRN PRN 13 3 Days Ago 

History


 


Bupropion HCl [Wellbutrin XL] 300 mg PO QAM #30 tab.er.24h 03/29/15 06/16/17 3 

Days Ago Rx


 


Venlafaxine Xr [Effexor XR] 150 mg PO ONCE #30 capsule 03/29/15 06/16/17 3 Days 

Ago Rx


 


Zolpidem [Ambien] 10 mg PO QHS #10 tablet 03/29/15 06/16/17 1 Day Ago Rx


 


clonazePAM [KlonoPIN] 2 mg PO BID 04/27/15 06/16/17 06/16/17 History


 


traMADol [Ultram 50 MG tab] 50 mg PO Q6HR PRN #30 tablet 17 

Unknown Rx


 


Cyclobenzaprine [Flexeril] 10 mg PO TID PRN #20 tablet 17 

Unknown Rx


 


HYDROcodone/APAP  [Norco 1 each PO Q8HR PRN #7 tablet 17 

Unknown Rx





 mg TAB]     


 


Ketorolac [Toradol] 10 mg PO Q6H PRN #20 tablet 17 Unknown Rx


 


Acetaminophen [Shake That Ache] 500 mg PO QID PRN #30 tablet 17  Unknown 

Rx


 


Cyclobenzaprine HCl [Flexeril 5 MG 5 mg PO BID PRN #30 tab 17  Unknown Rx





TAB]     














ED Physical Exam





- General


Limitations: No Limitations


General appearance: alert, in no apparent distress





- Head


Head exam: Present: atraumatic, normocephalic





- Eye


Eye exam: Present: normal appearance, PERRL, EOMI


Pupils: Present: normal accommodation





- ENT


ENT exam: Present: mucous membranes moist





- Neck


Neck exam: Present: normal inspection





- Respiratory


Respiratory exam: Present: normal lung sounds bilaterally.  Absent: respiratory 

distress, wheezes, rhonchi, stridor





- Cardiovascular


Cardiovascular Exam: Present: regular rate, normal rhythm.  Absent: systolic 

murmur, diastolic murmur, rubs, gallop





- GI/Abdominal


GI/Abdominal exam: Present: soft, normal bowel sounds





- Rectal


Rectal exam: Present: deferred





- Extremities Exam


Extremities exam: Present: normal inspection, full ROM, normal capillary 

refill.  Absent: tenderness, pedal edema, joint swelling, calf tenderness





- Back Exam


Back exam: Present: normal inspection.  Absent: CVA tenderness (R), CVA 

tenderness (L), muscle spasm, paraspinal tenderness, vertebral tenderness





- Expanded Back Exam


  ** Expanded


Back exam: Absent: saddle anesthesia


Back exam: Positive Straight Leg Raise: Right, Negative Straight Leg Raising: 

Left





- Neurological Exam


Neurological exam: Present: alert, oriented X3, CN II-XII intact





- Expanded Neurological Exam


  ** Expanded


Neurological exam: Absent: memory loss-recent event, tremor


Patient oriented to: Present: person, place, time


Speech: Present: fluid speech


Cranial nerves: EOM's Intact: Normal, Gag Reflex: Normal, Tongue Deviation: 

Normal, Nystagmus: Normal, Facial Sensation: Normal, Facial Palsy with Forehead 

Movement: Normal, Facial Palsy without Forehead Movement: Normal


Cerebellar function: Finger to Nose: Normal, Heel to Shin: Normal, Romberg: 

Normal


Upper motor neuron: Boris Neglect: Normal, Pronator Drift: Normal, Babinski Sign

: Normal, Sensory Extinction: Normal


Sensory exam: Upper Extremity Light Touch: Normal, Upper Extremity Pin Prick: 

Normal, Upper Extremity Temperature: Normal, UE 2 Point Discrimination: Normal, 

Lower Extremity Light Touch: Normal, Lower Extremity Pin Prick: Normal, Lower 

Extremity Temperature: Normal, LE 2 Point Discrimination: Normal


Motor strength exam: RUE: 5, LUE: 5, RLE: 5, LLE: 5


DTR: bicep (R): 2+, bicep (L): 2+, tricep (R): 2+, tricep (L): 2+, knee (R): 2+

, knee (L): 2+, ankle (R): 2+, ankle (L): 2+


Best Eye Response ([place]): (4) open spontaneously


Best Motor Response ([place]): (6) obeys commands


Best Verbal Response ([place]): (5) oriented


[place] Total: 15





- Psychiatric


Psychiatric exam: Present: normal affect, normal mood





- Skin


Skin exam: Present: warm, dry, intact, normal color.  Absent: rash





ED Course


 Vital Signs











  17





  16:44


 


Temperature 98.8 F


 


Pulse Rate 69


 


Respiratory 16





Rate 


 


Blood Pressure 156/94


 


O2 Sat by Pulse 95





Oximetry 














ED Medical Decision Making





- Radiology Data


Radiology results: image reviewed


interpreted by me: 


 no change from previous lumbar xrays no acute fracture








- Medical Decision Making


pt is a 60 y/o w/m with hx as noted in chart who presents for lumbar pain 

radiating right thigh , s/p fall from bench 1 day ago pt endorses " the fall 

aggravated my back" this pain is similar in location and intesity to usual 

exacerbated 4/10 aching burning radiating to right thigh, pt remains ambulatory 

to baseline there is no weakness no paresthesia no tingling no saddle numbness 

no decrease or loss of bowel or bladder function , inspection no deformity no 

reythema no edema no ecchymosis no stepoff mild posterior vertebral point 

tenderness no paraspinus muscle tenderness to deep palpation, pos straight leg 

right sitting, simulated axial rotation and loading reproduces pain , pain is 

improved with toradol given in ed to 2/10 pt has tramadol at home will rx 

tylenol and flexeril prn pain and spasm pt will follow up with primary care 

next week , pt given back exercises instructions , pt verbalized understanding 

and agreement with discharge plan. pt to home via pov and family member at this 

time. 





Critical care attestation.: 


If time is entered above; I have spent that time in minutes in the direct care 

of this critically ill patient, excluding procedure time.








ED Disposition


Clinical Impression: 


Lumbar strain


Qualifiers:


 Encounter type: sequela Qualified Code(s): S39.012S - Strain of muscle, fascia 

and tendon of lower back, sequela





Chronic low back pain with right-sided sciatica


Qualifiers:


 Back pain laterality: right Qualified Code(s): M54.41 - Lumbago with sciatica, 

right side; G89.29 - Other chronic pain





Disposition:  TO HOME OR SELFCARE


Is pt being admited?: No


Does the pt Need Aspirin: No


Condition: Good


Instructions:  Low Back Strain (ED)


Prescriptions: 


Acetaminophen [Shake That Ache] 500 mg PO QID PRN #30 tablet


 PRN Reason: pain


Cyclobenzaprine HCl [Flexeril 5 MG TAB] 5 mg PO BID PRN #30 tab


 PRN Reason: Spasms


Referrals: 


PRIMARY CARE,MD [Primary Care Provider] - 3-5 Days


Forms:  Work/School Release Form(ED)


Time of Disposition: 21:15

## 2017-06-17 NOTE — XRAY REPORT
LUMBAR SPINE THREE VIEWS: 06/16/17 16:27:00





CLINICAL: Fall and back pain.



COMPARISON: 04/09/17



FINDINGS: Normal vertebral body height, alignment and disk spaces. 

Small anterior osteophytes at L3-4 and L4-5. The pedicles are intact.  

No fracture.  Mild calcification of the abdominal aorta.



IMPRESSION: Mild degenerative changes no acute bony injury.

## 2017-08-20 ENCOUNTER — HOSPITAL ENCOUNTER (EMERGENCY)
Dept: HOSPITAL 5 - ED | Age: 62
LOS: 3 days | Discharge: PSYCHIATRIC OR SUBSTANCE USE FACILITY | End: 2017-08-23
Payer: MEDICAID

## 2017-08-20 DIAGNOSIS — D69.6: ICD-10-CM

## 2017-08-20 DIAGNOSIS — F32.9: Primary | ICD-10-CM

## 2017-08-20 DIAGNOSIS — I10: ICD-10-CM

## 2017-08-20 DIAGNOSIS — R45.851: ICD-10-CM

## 2017-08-20 DIAGNOSIS — R46.89: ICD-10-CM

## 2017-08-20 LAB
ALBUMIN SERPL-MCNC: 3.7 G/DL (ref 3.9–5)
ALBUMIN/GLOB SERPL: 1.8 %
ALP SERPL-CCNC: 35 UNITS/L (ref 35–129)
ALT SERPL-CCNC: 8 UNITS/L (ref 7–56)
ANION GAP SERPL CALC-SCNC: 14 MMOL/L
BILIRUB DIRECT SERPL-MCNC: < 0.2 MG/DL (ref 0–0.2)
BILIRUB INDIRECT SERPL-MCNC: 0.2 MG/DL
BILIRUB SERPL-MCNC: 0.4 MG/DL (ref 0.1–1.2)
BILIRUB UR QL STRIP: (no result)
BLASTOCYTES % (MANUAL): 0 %
BLOOD UR QL VISUAL: (no result)
BUN SERPL-MCNC: 19 MG/DL (ref 9–20)
BUN/CREAT SERPL: 21.11 %
CALCIUM SERPL-MCNC: 8.4 MG/DL (ref 8.4–10.2)
CHLORIDE SERPL-SCNC: 102.5 MMOL/L (ref 98–107)
CO2 SERPL-SCNC: 29 MMOL/L (ref 22–30)
GLUCOSE SERPL-MCNC: 111 MG/DL (ref 75–100)
HCT VFR BLD CALC: 36.5 % (ref 35.5–45.6)
HGB BLD-MCNC: 11.9 GM/DL (ref 11.8–15.2)
KETONES UR STRIP-MCNC: (no result) MG/DL
LEUKOCYTE ESTERASE UR QL STRIP: (no result)
MCH RBC QN AUTO: 27 PG (ref 28–32)
MCHC RBC AUTO-ENTMCNC: 33 % (ref 32–34)
MCV RBC AUTO: 83 FL (ref 84–94)
NITRITE UR QL STRIP: (no result)
PH UR STRIP: 8 [PH] (ref 5–7)
PLATELET # BLD: 86 K/MM3 (ref 140–440)
POTASSIUM SERPL-SCNC: 4.1 MMOL/L (ref 3.6–5)
PROT SERPL-MCNC: 5.8 G/DL (ref 6.3–8.2)
PROT UR STRIP-MCNC: (no result) MG/DL
RBC # BLD AUTO: 4.39 M/MM3 (ref 3.65–5.03)
RBC #/AREA URNS HPF: 3 /HPF (ref 0–6)
SODIUM SERPL-SCNC: 141 MMOL/L (ref 137–145)
TOTAL CELLS COUNTED PERCENT: 13
URINE DRUGS OF ABUSE NOTE: (no result)
UROBILINOGEN UR-MCNC: 4 MG/DL (ref ?–2)
WBC # BLD AUTO: 3.3 K/MM3 (ref 4.5–11)
WBC #/AREA URNS HPF: < 1 /HPF (ref 0–6)

## 2017-08-20 PROCEDURE — 96372 THER/PROPH/DIAG INJ SC/IM: CPT

## 2017-08-20 PROCEDURE — 99285 EMERGENCY DEPT VISIT HI MDM: CPT

## 2017-08-20 PROCEDURE — 81001 URINALYSIS AUTO W/SCOPE: CPT

## 2017-08-20 PROCEDURE — 36415 COLL VENOUS BLD VENIPUNCTURE: CPT

## 2017-08-20 PROCEDURE — A6250 SKIN SEAL PROTECT MOISTURIZR: HCPCS

## 2017-08-20 PROCEDURE — 80074 ACUTE HEPATITIS PANEL: CPT

## 2017-08-20 PROCEDURE — 80320 DRUG SCREEN QUANTALCOHOLS: CPT

## 2017-08-20 PROCEDURE — G0480 DRUG TEST DEF 1-7 CLASSES: HCPCS

## 2017-08-20 PROCEDURE — 80164 ASSAY DIPROPYLACETIC ACD TOT: CPT

## 2017-08-20 PROCEDURE — 80307 DRUG TEST PRSMV CHEM ANLYZR: CPT

## 2017-08-20 PROCEDURE — 80048 BASIC METABOLIC PNL TOTAL CA: CPT

## 2017-08-20 PROCEDURE — 85025 COMPLETE CBC W/AUTO DIFF WBC: CPT

## 2017-08-20 PROCEDURE — 85007 BL SMEAR W/DIFF WBC COUNT: CPT

## 2017-08-20 NOTE — EMERGENCY DEPARTMENT REPORT
ED Psych HPI





- General


Chief Complaint: Psych


Stated Complaint: SUICIDE ATTEMPT


Time Seen by Provider: 08/20/17 11:01


Source: patient, police, EMS


Mode of arrival: Stretcher


Limitations: No Limitations





- History of Present Illness


Initial Comments: 





61-year-old male with a past medical history of bipolar, PTSD, arthritis, 

hypertension, and depression presents to the hospital complains of suicidal 

ideation and self stimulating behavior.  Patient states he has not taken his 

psychiatric medications 1 week.  He lives in [facility].  He states 

that despite pain them monthly he only gets fed once a day.  He is fed hotdogs, 

baloney sandwiches, and chips.  He will not take his medication because he doesn

't feel like he has enough food in his system to take it.  This morning he 

began feeling more depressed and therefore his left forearm multiple times.  He 

complains of feeling suicidal prior to arrival.  He has hallucinations 

"sometimes" but denies them currently.  Patient states last tetanus shot was 

approximately 8 years ago





- Related Data


 Home Medications











 Medication  Instructions  Recorded  Confirmed  Last Taken


 


clonazePAM [KlonoPIN] 2 mg PO BID 04/27/15 08/20/17 06/16/17


 


Divalproex ER [DepaKOTE ER] 2,500 mg PO QDAY 08/20/17 08/20/17 Unknown


 


Venlafaxine HCl [Effexor Xr] 150 mg PO DAILY 08/20/17 08/20/17 Unknown


 


Venlafaxine [Effexor] 75 mg PO DAILY 08/20/17 08/20/17 Unknown


 


clonazePAM [KlonoPIN] 2 mg PO TID 08/20/17 08/20/17 Unknown








 Previous Rx's











 Medication  Instructions  Recorded  Last Taken  Type


 


Bupropion HCl [Wellbutrin XL] 300 mg PO QAM #30 tab.er.24h 03/29/15 3 Days Ago 

Rx











 Allergies











Allergy/AdvReac Type Severity Reaction Status Date / Time


 


buspirone HCl [From BuSpar] AdvReac  PSYCHOTIC Verified 04/19/17 13:41


 


haloperidol [From Haldol] AdvReac  HANDS Verified 04/19/17 13:41





   SWELL /  





   PSYCHOTIC  


 


haloperidol lactate AdvReac  HANDS Verified 04/19/17 13:41





[From Haldol]   SWELL /  





   PSYCHOTIC  


 


olanzapine [From Zyprexa] AdvReac  PSYCHOTIC Verified 04/19/17 13:41


 


quetiapine fumarate AdvReac  PSYCHOTIC Verified 04/19/17 13:41





[From Seroquel]     














ED Review of Systems


ROS: 


Stated complaint: SUICIDE ATTEMPT


Other details as noted in HPI





Comment: All other systems reviewed and negative


Other: 





Constitutional: No fevers chills


Eyes: No eye pain visual changes


ENT: No ear pain or throat pain


Neck: Denies pain


Respiratory: Denies cough wheezing shortness of breath


Cardiovascular: Denies chest pain, palpitations, syncope


GI: Denies abdominal pain, nausea, vomiting, diarrhea


: Denies dysuria


Musculoskeletal: Denies back pain


Skin: As per HPI


Neurologic: Denies headache, numbness, weakness


Psychiatric: Denies suicidal ideation, hallucinations








ED Past Medical Hx





- Past Medical History


Previous Medical History?: Yes


Hx Hypertension: Yes


Hx Arthritis: Yes


Hx Psychiatric Treatment: Yes (anxiety, ptsd, depression)


Additional medical history: CHRONIC BACK PAIN.  HARD OF HEARING.  BULGING DISCS





- Surgical History


Past Surgical History?: Yes


Additional Surgical History: L leg surgery (1995)





- Social History


Smoking Status: Current Every Day Smoker





- Medications


Home Medications: 


 Home Medications











 Medication  Instructions  Recorded  Confirmed  Last Taken  Type


 


Bupropion HCl [Wellbutrin XL] 300 mg PO QAM #30 tab.er.24h 03/29/15 08/20/17 3 

Days Ago Rx


 


clonazePAM [KlonoPIN] 2 mg PO BID 04/27/15 08/20/17 06/16/17 History


 


Divalproex ER [DepaKOTE ER] 2,500 mg PO QDAY 08/20/17 08/20/17 Unknown History


 


Venlafaxine HCl [Effexor Xr] 150 mg PO DAILY 08/20/17 08/20/17 Unknown History


 


Venlafaxine [Effexor] 75 mg PO DAILY 08/20/17 08/20/17 Unknown History


 


clonazePAM [KlonoPIN] 2 mg PO TID 08/20/17 08/20/17 Unknown History














ED Physical Exam





- General


Limitations: Other





- Other


Other exam information: 





General: No limitations, patient is alert in no acute distress


Head exam: Atraumatic, normocephalic


Eyes exam: Normal appearance


ENT: Moist mucous membrane, normal oropharynx


Neck exam: Normal inspection, full range of motion, no meningismus nontender


Respiratory exam: Clear to auscultation bilateral, no wheezes, rales, crackles


Cardiovascular: Normal rate and rhythm


Abdomen: Soft, nondistended, and  nontender, with normal bowel sounds, no 

rebound, or guarding


Extremity: Full range of motion normal inspection no deformity


Back: Normal Inspection, full range of motion, no tenderness


Neurologic: Alert, oriented x3, cranial nerves intact, no motor or sensory 

deficit


Psychiatric: normal affect, normal mood


Skin: Multiple superficial linear lacerations to left forearm not requiring 

stitches.  No active bleeding





ED Course


 Vital Signs











  08/20/17 08/20/17





  10:22 11:40


 


Temperature 98.5 F 


 


Pulse Rate 74 


 


Respiratory 19 19





Rate  


 


Blood Pressure 157/92 


 


O2 Sat by Pulse 100 





Oximetry  














- Consultations


Consultation #1: 





08/20/17


Case discussed with hematologist is Dr. Elizabeth requiring patient's new 

thrombocytopenia.  He is suggests to contact the pharmacy regarding possible 

medications that might be the culprit








ED Medical Decision Making





- Lab Data


Result diagrams: 


 08/20/17 10:40





 08/20/17 10:40








 Lab Results











  08/20/17 08/20/17 08/20/17 Range/Units





  10:34 10:34 10:40 


 


WBC     (4.5-11.0)  K/mm3


 


RBC     (3.65-5.03)  M/mm3


 


Hgb     (11.8-15.2)  gm/dl


 


Hct     (35.5-45.6)  %


 


MCV     (84-94)  fl


 


MCH     (28-32)  pg


 


MCHC     (32-34)  %


 


RDW     (13.2-15.2)  %


 


Plt Count     (140-440)  K/mm3


 


Mono % (Auto)     


 


Add Manual Diff     


 


Total Counted     


 


Seg Neuts % (Manual)     (40.0-70.0)  %


 


Band Neutrophils %     %


 


Lymphocytes % (Manual)     (13.4-35.0)  %


 


Reactive Lymphs % (Man)     %


 


Monocytes % (Manual)     (0.0-7.3)  %


 


Eosinophils % (Manual)     (0.0-4.3)  %


 


Basophils % (Manual)     (0.0-1.8)  %


 


Metamyelocytes %     %


 


Myelocytes %     %


 


Promyelocytes %     %


 


Blast Cells %     %


 


Nucleated RBC %     


 


Seg Neutrophils # Man     (1.8-7.7)  K/mm3


 


Band Neutrophils #     K/mm3


 


Lymphocytes # (Manual)     (1.2-5.4)  K/mm3


 


Abs React Lymphs (Man)     K/mm3


 


Monocytes # (Manual)     (0.0-0.8)  K/mm3


 


Eosinophils # (Manual)     (0.0-0.4)  K/mm3


 


Basophils # (Manual)     (0.0-0.1)  K/mm3


 


Metamyelocytes #     K/mm3


 


Myelocytes #     K/mm3


 


Promyelocytes #     K/mm3


 


Blast Cells #     K/mm3


 


WBC Morphology     


 


Hypersegmented Neuts     


 


Hyposegmented Neuts     


 


Hypogranular Neuts     


 


Smudge Cells     


 


Toxic Granulation     


 


Toxic Vacuolation     


 


Dohle Bodies     


 


Pelger-Huet Anomaly     


 


Isaiah Rods     


 


Platelet Estimate     


 


Clumped Platelets     


 


Plt Clumps, EDTA     


 


Large Platelets     


 


Giant Platelets     


 


Platelet Satelliting     


 


Plt Morphology Comment     


 


RBC Morphology     


 


Dimorphic RBCs     


 


Polychromasia     


 


Hypochromasia     


 


Poikilocytosis     


 


Anisocytosis     


 


Microcytosis     


 


Macrocytosis     


 


Spherocytes     


 


Pappenheimer Bodies     


 


Sickle Cells     


 


Target Cells     


 


Tear Drop Cells     


 


Ovalocytes     


 


Helmet Cells     


 


[place]line[place] Bodies     


 


Cabot Rings     


 


Blackwell Cells     


 


Bite Cells     


 


Crenated Cell     


 


Elliptocytes     


 


Acanthocytes (Spur)     


 


Rouleaux     


 


Hemoglobin C Crystals     


 


Schistocytes     


 


Malaria parasites     


 


Jaquan Bodies     


 


Hem Pathologist Commnt     


 


Sodium    141  (137-145)  mmol/L


 


Potassium    4.1  (3.6-5.0)  mmol/L


 


Chloride    102.5  ()  mmol/L


 


Carbon Dioxide    29  (22-30)  mmol/L


 


Anion Gap    14  mmol/L


 


BUN    19  (9-20)  mg/dL


 


Creatinine    0.9  (0.8-1.5)  mg/dL


 


Estimated GFR    > 60  ml/min


 


BUN/Creatinine Ratio    21.11  %


 


Glucose    111 H  ()  mg/dL


 


Calcium    8.4  (8.4-10.2)  mg/dL


 


Total Bilirubin     (0.1-1.2)  mg/dL


 


Direct Bilirubin     (0-0.2)  mg/dL


 


Indirect Bilirubin     mg/dL


 


AST     (5-40)  units/L


 


ALT     (7-56)  units/L


 


Alkaline Phosphatase     ()  units/L


 


Total Protein     (6.3-8.2)  g/dL


 


Albumin     (3.9-5)  g/dL


 


Albumin/Globulin Ratio     %


 


Urine Color  Yellow    (Yellow)  


 


Urine Turbidity  Clear    (Clear)  


 


Urine pH  8.0 H    (5.0-7.0)  


 


Ur Specific Gravity  1.017    (1.003-1.030)  


 


Urine Protein  <15 mg/dl    (Negative)  mg/dL


 


Urine Glucose (UA)  Neg    (Negative)  mg/dL


 


Urine Ketones  Neg    (Negative)  mg/dL


 


Urine Blood  Neg    (Negative)  


 


Urine Nitrite  Neg    (Negative)  


 


Ur Reducing Substances  Not Reportable    


 


Urine Bilirubin  Neg    (Negative)  


 


Urine Ictotest  Not Reportable    


 


Urine Urobilinogen  4.0    (<2.0)  mg/dL


 


Ur Leukocyte Esterase  Neg    (Negative)  


 


Urine WBC (Auto)  < 1.0    (0.0-6.0)  /HPF


 


Urine RBC (Auto)  3.0    (0.0-6.0)  /HPF


 


Urine Opiates Screen   Presumptive negative   


 


Urine Methadone Screen   Presumptive negative   


 


Ur Barbiturates Screen   Presumptive negative   


 


Valproic Acid     ()  ug/mL


 


Ur Phencyclidine Scrn   Presumptive negative   


 


Ur Amphetamines Screen   Presumptive negative   


 


U Benzodiazepines Scrn   Presumptive negative   


 


Urine Cocaine Screen   Presumptive negative   


 


U Marijuana (THC) Screen   Presumptive negative   


 


Drugs of Abuse Note   Disclamer   


 


Plasma/Serum Alcohol     (0-0.07)  gm%














  08/20/17 08/20/17 08/20/17 Range/Units





  10:40 10:40 10:40 


 


WBC   3.3 L   (4.5-11.0)  K/mm3


 


RBC   4.39   (3.65-5.03)  M/mm3


 


Hgb   11.9   (11.8-15.2)  gm/dl


 


Hct   36.5   (35.5-45.6)  %


 


MCV   83 L   (84-94)  fl


 


MCH   27 L   (28-32)  pg


 


MCHC   33   (32-34)  %


 


RDW   16.1 H   (13.2-15.2)  %


 


Plt Count   86 L   (140-440)  K/mm3


 


Mono % (Auto)   Np   


 


Add Manual Diff   Complete   


 


Total Counted   100   


 


Seg Neuts % (Manual)   69.0   (40.0-70.0)  %


 


Band Neutrophils %   6.0   %


 


Lymphocytes % (Manual)   12.0 L   (13.4-35.0)  %


 


Reactive Lymphs % (Man)   0   %


 


Monocytes % (Manual)   11.0 H   (0.0-7.3)  %


 


Eosinophils % (Manual)   2.0   (0.0-4.3)  %


 


Basophils % (Manual)   0   (0.0-1.8)  %


 


Metamyelocytes %   0   %


 


Myelocytes %   0   %


 


Promyelocytes %   0   %


 


Blast Cells %   0   %


 


Nucleated RBC %   Not Reportable   


 


Seg Neutrophils # Man   2.3   (1.8-7.7)  K/mm3


 


Band Neutrophils #   0.2   K/mm3


 


Lymphocytes # (Manual)   0.4 L   (1.2-5.4)  K/mm3


 


Abs React Lymphs (Man)   0.0   K/mm3


 


Monocytes # (Manual)   0.4   (0.0-0.8)  K/mm3


 


Eosinophils # (Manual)   0.1   (0.0-0.4)  K/mm3


 


Basophils # (Manual)   0.0   (0.0-0.1)  K/mm3


 


Metamyelocytes #   0.0   K/mm3


 


Myelocytes #   0.0   K/mm3


 


Promyelocytes #   0.0   K/mm3


 


Blast Cells #   0.0   K/mm3


 


WBC Morphology   Not Reportable   


 


Hypersegmented Neuts   Not Reportable   


 


Hyposegmented Neuts   Not Reportable   


 


Hypogranular Neuts   Not Reportable   


 


Smudge Cells   Not Reportable   


 


Toxic Granulation   Not Reportable   


 


Toxic Vacuolation   Not Reportable   


 


Dohle Bodies   Not Reportable   


 


Pelger-Huet Anomaly   Not Reportable   


 


Isaiah Rods   Not Reportable   


 


Platelet Estimate   Not Reportable   


 


Clumped Platelets   Not Reportable   


 


Plt Clumps, EDTA   Not Reportable   


 


Large Platelets   Not Reportable   


 


Giant Platelets   Not Reportable   


 


Platelet Satelliting   Not Reportable   


 


Plt Morphology Comment   Not Reportable   


 


RBC Morphology   Not Reportable   


 


Dimorphic RBCs   Not Reportable   


 


Polychromasia   Not Reportable   


 


Hypochromasia   Not Reportable   


 


Poikilocytosis   Not Reportable   


 


Anisocytosis   Few   


 


Microcytosis   Not Reportable   


 


Macrocytosis   Not Reportable   


 


Spherocytes   Not Reportable   


 


Pappenheimer Bodies   Not Reportable   


 


Sickle Cells   Not Reportable   


 


Target Cells   Not Reportable   


 


Tear Drop Cells   Not Reportable   


 


Ovalocytes   Not Reportable   


 


Helmet Cells   Not Reportable   


 


Cline-[place] Bodies   Not Reportable   


 


Cabot Rings   Not Reportable   


 


Yady Cells   Not Reportable   


 


Bite Cells   Not Reportable   


 


Crenated Cell   Not Reportable   


 


Elliptocytes   Not Reportable   


 


Acanthocytes (Spur)   Not Reportable   


 


Rouleaux   Not Reportable   


 


Hemoglobin C Crystals   Not Reportable   


 


Schistocytes   Not Reportable   


 


Malaria parasites   Not Reportable   


 


Jaquan Bodies   Not Reportable   


 


Hem Pathologist Commnt   No   


 


Sodium     (137-145)  mmol/L


 


Potassium     (3.6-5.0)  mmol/L


 


Chloride     ()  mmol/L


 


Carbon Dioxide     (22-30)  mmol/L


 


Anion Gap     mmol/L


 


BUN     (9-20)  mg/dL


 


Creatinine     (0.8-1.5)  mg/dL


 


Estimated GFR     ml/min


 


BUN/Creatinine Ratio     %


 


Glucose     ()  mg/dL


 


Calcium     (8.4-10.2)  mg/dL


 


Total Bilirubin    0.40  (0.1-1.2)  mg/dL


 


Direct Bilirubin    < 0.2  (0-0.2)  mg/dL


 


Indirect Bilirubin    0.2  mg/dL


 


AST    9  (5-40)  units/L


 


ALT    8  (7-56)  units/L


 


Alkaline Phosphatase    35  ()  units/L


 


Total Protein    5.8 L  (6.3-8.2)  g/dL


 


Albumin    3.7 L  (3.9-5)  g/dL


 


Albumin/Globulin Ratio    1.8  %


 


Urine Color     (Yellow)  


 


Urine Turbidity     (Clear)  


 


Urine pH     (5.0-7.0)  


 


Ur Specific Gravity     (1.003-1.030)  


 


Urine Protein     (Negative)  mg/dL


 


Urine Glucose (UA)     (Negative)  mg/dL


 


Urine Ketones     (Negative)  mg/dL


 


Urine Blood     (Negative)  


 


Urine Nitrite     (Negative)  


 


Ur Reducing Substances     


 


Urine Bilirubin     (Negative)  


 


Urine Ictotest     


 


Urine Urobilinogen     (<2.0)  mg/dL


 


Ur Leukocyte Esterase     (Negative)  


 


Urine WBC (Auto)     (0.0-6.0)  /HPF


 


Urine RBC (Auto)     (0.0-6.0)  /HPF


 


Urine Opiates Screen     


 


Urine Methadone Screen     


 


Ur Barbiturates Screen     


 


Valproic Acid     ()  ug/mL


 


Ur Phencyclidine Scrn     


 


Ur Amphetamines Screen     


 


U Benzodiazepines Scrn     


 


Urine Cocaine Screen     


 


U Marijuana (THC) Screen     


 


Drugs of Abuse Note     


 


Plasma/Serum Alcohol  < 0.01    (0-0.07)  gm%














  08/20/17 Range/Units





  11:02 


 


WBC   (4.5-11.0)  K/mm3


 


RBC   (3.65-5.03)  M/mm3


 


Hgb   (11.8-15.2)  gm/dl


 


Hct   (35.5-45.6)  %


 


MCV   (84-94)  fl


 


MCH   (28-32)  pg


 


MCHC   (32-34)  %


 


RDW   (13.2-15.2)  %


 


Plt Count   (140-440)  K/mm3


 


Mono % (Auto)   


 


Add Manual Diff   


 


Total Counted   


 


Seg Neuts % (Manual)   (40.0-70.0)  %


 


Band Neutrophils %   %


 


Lymphocytes % (Manual)   (13.4-35.0)  %


 


Reactive Lymphs % (Man)   %


 


Monocytes % (Manual)   (0.0-7.3)  %


 


Eosinophils % (Manual)   (0.0-4.3)  %


 


Basophils % (Manual)   (0.0-1.8)  %


 


Metamyelocytes %   %


 


Myelocytes %   %


 


Promyelocytes %   %


 


Blast Cells %   %


 


Nucleated RBC %   


 


Seg Neutrophils # Man   (1.8-7.7)  K/mm3


 


Band Neutrophils #   K/mm3


 


Lymphocytes # (Manual)   (1.2-5.4)  K/mm3


 


Abs React Lymphs (Man)   K/mm3


 


Monocytes # (Manual)   (0.0-0.8)  K/mm3


 


Eosinophils # (Manual)   (0.0-0.4)  K/mm3


 


Basophils # (Manual)   (0.0-0.1)  K/mm3


 


Metamyelocytes #   K/mm3


 


Myelocytes #   K/mm3


 


Promyelocytes #   K/mm3


 


Blast Cells #   K/mm3


 


WBC Morphology   


 


Hypersegmented Neuts   


 


Hyposegmented Neuts   


 


Hypogranular Neuts   


 


Smudge Cells   


 


Toxic Granulation   


 


Toxic Vacuolation   


 


Dohle Bodies   


 


Pelger-Huet Anomaly   


 


Isaiah Rods   


 


Platelet Estimate   


 


Clumped Platelets   


 


Plt Clumps, EDTA   


 


Large Platelets   


 


Giant Platelets   


 


Platelet Satelliting   


 


Plt Morphology Comment   


 


RBC Morphology   


 


Dimorphic RBCs   


 


Polychromasia   


 


Hypochromasia   


 


Poikilocytosis   


 


Anisocytosis   


 


Microcytosis   


 


Macrocytosis   


 


Spherocytes   


 


Pappenheimer Bodies   


 


Sickle Cells   


 


Target Cells   


 


Tear Drop Cells   


 


Ovalocytes   


 


Helmet Cells   


 


Cline-[place] Bodies   


 


Cabot Rings   


 


Blackwell Cells   


 


Bite Cells   


 


Crenated Cell   


 


Elliptocytes   


 


Acanthocytes (Spur)   


 


Rouleaux   


 


Hemoglobin C Crystals   


 


Schistocytes   


 


Malaria parasites   


 


Jaquan Bodies   


 


Hem Pathologist Commnt   


 


Sodium   (137-145)  mmol/L


 


Potassium   (3.6-5.0)  mmol/L


 


Chloride   ()  mmol/L


 


Carbon Dioxide   (22-30)  mmol/L


 


Anion Gap   mmol/L


 


BUN   (9-20)  mg/dL


 


Creatinine   (0.8-1.5)  mg/dL


 


Estimated GFR   ml/min


 


BUN/Creatinine Ratio   %


 


Glucose   ()  mg/dL


 


Calcium   (8.4-10.2)  mg/dL


 


Total Bilirubin   (0.1-1.2)  mg/dL


 


Direct Bilirubin   (0-0.2)  mg/dL


 


Indirect Bilirubin   mg/dL


 


AST   (5-40)  units/L


 


ALT   (7-56)  units/L


 


Alkaline Phosphatase   ()  units/L


 


Total Protein   (6.3-8.2)  g/dL


 


Albumin   (3.9-5)  g/dL


 


Albumin/Globulin Ratio   %


 


Urine Color   (Yellow)  


 


Urine Turbidity   (Clear)  


 


Urine pH   (5.0-7.0)  


 


Ur Specific Gravity   (1.003-1.030)  


 


Urine Protein   (Negative)  mg/dL


 


Urine Glucose (UA)   (Negative)  mg/dL


 


Urine Ketones   (Negative)  mg/dL


 


Urine Blood   (Negative)  


 


Urine Nitrite   (Negative)  


 


Ur Reducing Substances   


 


Urine Bilirubin   (Negative)  


 


Urine Ictotest   


 


Urine Urobilinogen   (<2.0)  mg/dL


 


Ur Leukocyte Esterase   (Negative)  


 


Urine WBC (Auto)   (0.0-6.0)  /HPF


 


Urine RBC (Auto)   (0.0-6.0)  /HPF


 


Urine Opiates Screen   


 


Urine Methadone Screen   


 


Ur Barbiturates Screen   


 


Valproic Acid  15.5 L  ()  ug/mL


 


Ur Phencyclidine Scrn   


 


Ur Amphetamines Screen   


 


U Benzodiazepines Scrn   


 


Urine Cocaine Screen   


 


U Marijuana (THC) Screen   


 


Drugs of Abuse Note   


 


Plasma/Serum Alcohol   (0-0.07)  gm%














- Medical Decision Making





Bacitracin applied to the wounds with gauze dressing.








1013 and transfer forms have been signed.  





Pharmacy suggests that all of patient's prescribed meds may cause 

thrombocytopenia.  Medications will be held at this time pending psychiatrist 

review.





No signs of active bleeding at this time.  Patient medically clear for 

psychiatric admission





- Differential Diagnosis


borderline, psychosis, suicidal ideation, self mutilating behavior


Critical Care Time: No


Critical care attestation.: 


If time is entered above; I have spent that time in minutes in the direct care 

of this critically ill patient, excluding procedure time.








ED Disposition


Clinical Impression: 


 Depression, Suicidal ideation, Self mutilating behavior, Thrombocytopenia, 

Medical clearance for psychiatric admission





Disposition: DC/TX-65 PSY HOSP/PSY UNIT


Is pt being admited?: No


Condition: Stable


Time of Disposition: 16:41 (awaiting acceptance)
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
## 2017-08-21 RX ADMIN — VENLAFAXINE HYDROCHLORIDE SCH: 75 CAPSULE, EXTENDED RELEASE ORAL at 11:05

## 2017-08-21 NOTE — PROGRESS NOTE
Subjective





- Reason for Consult


Consult date: 08/21/17


Reason for consult: Psychiatry Follow-up





- Chief Complaint


Chief complaint: 


"I want another place to stay"





61-year-old male with a past medical history of bipolar, PTSD, arthritis, 

hypertension, and depression presents to the hospital after cutting himself 5-6 

times on his left forearm. Today patient is calm and cooperative during 

assessment. He stated that he want another place to stay. He currently reside 

at [facility]. He stated that he cut his wrist in "anger" prior to coming to 

[facility]. He stated today that he should not done that. He stated that the staff at 

[facility] only feed him once a day. He admitted that he have attempted suicide 

in the past. He denies SI/HI's and AVH's, but rate his depression 6/10, with 10 

being the worse. He stated that he have taken Effexor and Effexor XR with no 

side effects in the past. 











Mental Status Exam





- Vital signs


 Last Vital Signs











Temp  98.8 F   08/21/17 09:09


 


Pulse  65   08/21/17 09:09


 


Resp  16   08/21/17 09:10


 


BP  165/87   08/21/17 09:09


 


Pulse Ox  98   08/21/17 09:10














- Exam


Narrative exam: 


MSE:





 Appearance: calm, cooperative


 Behavior: regular eye contact


 Speech: slow rate and tone  


 Mood: "okay" 


 Affect: labile


 Thought Process: circumstantial


 Thought Content: denies SI/HI's and AVH's


 Motor Activity: lying in bed 


 Cognition: A/Ox 3


 Insight: limited


 Judgment: limited











Assessment and Plan


Impression: bipolar disorder, current episode depressed, PTSD. Today patient is 

calm and cooperative during assessment.PLT 86. 





Recommendation/Plan: Continue 1013 and transfer to inpatient psychiatric 

facility. Continue Effexor XR 225mg qam and consider another mood stabilizer. 


In order to avoid withdrawal from benzos, will continue klonopin 1mg tid. Given 

the half life of klonopin and length of use, it is possible to have withdrawal 

symptoms days from his last dose. 





After discussion with the ER physician, Dr. Floyd, the depakote will be held and 

he will be observed closely. A CBC should be repeated if his stay is prolonged 

waiting on placement. Per Dr. Floyd, the blood counts can be addressed on an 

outpatient basis.

## 2017-08-22 LAB
BLASTOCYTES % (MANUAL): 0 %
HCT VFR BLD CALC: 41.2 % (ref 35.5–45.6)
HGB BLD-MCNC: 13.9 GM/DL (ref 11.8–15.2)
MCH RBC QN AUTO: 28 PG (ref 28–32)
MCHC RBC AUTO-ENTMCNC: 34 % (ref 32–34)
MCV RBC AUTO: 82 FL (ref 84–94)
PLATELET # BLD: 117 K/MM3 (ref 140–440)
RBC # BLD AUTO: 5.03 M/MM3 (ref 3.65–5.03)
TOTAL CELLS COUNTED PERCENT: 20
WBC # BLD AUTO: 3.1 K/MM3 (ref 4.5–11)

## 2017-08-22 RX ADMIN — VENLAFAXINE HYDROCHLORIDE SCH MG: 75 CAPSULE, EXTENDED RELEASE ORAL at 10:14

## 2017-08-23 VITALS — SYSTOLIC BLOOD PRESSURE: 168 MMHG | DIASTOLIC BLOOD PRESSURE: 103 MMHG

## 2017-08-23 RX ADMIN — VENLAFAXINE HYDROCHLORIDE SCH MG: 75 CAPSULE, EXTENDED RELEASE ORAL at 11:23
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
## 2020-03-15 NOTE — EMERGENCY DEPARTMENT REPORT
ED Psych HPI





- General


Stated Complaint: OUT OF KLONOPIN,WITHDRAWLS


Time Seen by Provider: 03/15/20 09:56


Source: patient, EMS, old records reviewed


Mode of arrival: Ambulatory


Limitations: No Limitations





- History of Present Illness


Initial Comments: 





Mr. Story is a 64-year-old male with history of anxiety disorder, bipolar 

disorder who presents with suicidal ideation.  He has a plan to cut his wrist.  

He feels shaky.  He has been without Klonopin for the past 3 days.  He denies 

any pain.  He states "I want to die.  I have a plan.  But I want to live."





Mr. Story has history of previous suicide attempts.


MD Complaint: suicidal ideation, feels depressed


-: Gradual, days(s) (1)


Associated Psychiatric Symptoms: depression, suicidal ideation


History of same: Yes


Quality: constant


Improves With: none


Worsens With: none


Context: other (Has been without Klonopin for 3 days)


Associated Symptoms: denies other symptoms


If Self Harm: admits thoughts of, has plan





- Related Data


                                Home Medications











 Medication  Instructions  Recorded  Confirmed  Last Taken


 


clonazePAM [KlonoPIN] 2 mg PO BID 04/27/15 08/20/17 06/16/17





    2 MG


 


Divalproex ER [DepaKOTE ER] 2,500 mg PO QDAY 08/20/17 08/20/17 Unknown


 


Venlafaxine HCl [Effexor Xr] 150 mg PO DAILY 08/20/17 08/20/17 Unknown


 


Venlafaxine [Effexor] 75 mg PO DAILY 08/20/17 08/20/17 Unknown


 


clonazePAM [KlonoPIN] 2 mg PO TID 08/20/17 08/20/17 Unknown








                                  Previous Rx's











 Medication  Instructions  Recorded  Last Taken  Type


 


Bupropion HCl [Wellbutrin XL] 300 mg PO QAM #30 tab.er.24h 03/29/15 3 Days Ago 

Rx





   ~05/15/15 











                                    Allergies











Allergy/AdvReac Type Severity Reaction Status Date / Time


 


buspirone HCl [From BuSpar] AdvReac  PSYCHOTIC Verified 11/30/18 16:59


 


haloperidol [From Haldol] AdvReac  HANDS Verified 11/30/18 16:59





   SWELL /  





   PSYCHOTIC  


 


haloperidol lactate AdvReac  HANDS Verified 11/30/18 16:59





[From Haldol]   SWELL /  





   PSYCHOTIC  


 


olanzapine [From Zyprexa] AdvReac  PSYCHOTIC Verified 11/30/18 16:59


 


quetiapine fumarate AdvReac  PSYCHOTIC Verified 11/30/18 16:59





[From Seroquel]     














ED Review of Systems


ROS: 


Stated complaint: OUT OF KLONOPIN,WITHDRAWLS


Other details as noted in HPI





Comment: All other systems reviewed and negative


Constitutional: denies: fever, malaise


Respiratory: denies: cough


Cardiovascular: denies: chest pain





ED Past Medical Hx





- Past Medical History


Previous Medical History?: Yes


Hx Hypertension: Yes


Hx Arthritis: Yes


Hx Psychiatric Treatment: Yes (anxiety, ptsd, depression, bipolar)


Additional medical history: CHRONIC BACK PAIN.  HARD OF HEARING.  BULGING DISCS





- Surgical History


Past Surgical History?: Yes


Additional Surgical History: L leg surgery (1995)





- Social History


Smoking Status: Former Smoker (none 5 days)


Substance Use Type: None (denies illicit drug use)





- Medications


Home Medications: 


                                Home Medications











 Medication  Instructions  Recorded  Confirmed  Last Taken  Type


 


Bupropion HCl [Wellbutrin XL] 300 mg PO QAM #30 tab.er.24h 03/29/15 08/20/17 3 

Days Ago Rx





    ~05/15/15 


 


clonazePAM [KlonoPIN] 2 mg PO BID 04/27/15 08/20/17 06/16/17 History





    2 MG 


 


Divalproex ER [DepaKOTE ER] 2,500 mg PO QDAY 08/20/17 08/20/17 Unknown History


 


Venlafaxine HCl [Effexor Xr] 150 mg PO DAILY 08/20/17 08/20/17 Unknown History


 


Venlafaxine [Effexor] 75 mg PO DAILY 08/20/17 08/20/17 Unknown History


 


clonazePAM [KlonoPIN] 2 mg PO TID 08/20/17 08/20/17 Unknown History














ED Physical Exam





- General


General appearance: alert, in no apparent distress, anxious





- Head


Head exam: Present: atraumatic, normocephalic





- Eye


Eye exam: Present: normal appearance





- ENT


ENT exam: Present: mucous membranes moist





- Neck


Neck exam: Present: normal inspection, full ROM





- Respiratory


Respiratory exam: Present: normal lung sounds bilaterally.  Absent: respiratory 

distress, wheezes, rales, rhonchi





- Cardiovascular


Cardiovascular Exam: Present: regular rate, normal rhythm, normal heart sounds. 

 Absent: systolic murmur, diastolic murmur, rubs, gallop





- GI/Abdominal


GI/Abdominal exam: Present: soft, normal bowel sounds.  Absent: distended, 

tenderness, guarding, rebound





- Rectal


Rectal exam: Present: deferred





- Extremities Exam


Extremities exam: Present: normal inspection





- Back Exam


Back exam: Present: normal inspection





- Neurological Exam


Neurological exam: Present: alert, oriented X3





- Psychiatric


Psychiatric exam: Present: normal affect, anxious, suicidal ideation





- Skin


Skin exam: Present: warm, dry, intact, normal color.  Absent: rash





ED Course


                                   Vital Signs











  03/15/20





  10:09


 


Temperature 99.3 F


 


Pulse Rate 102 H


 


Respiratory 18





Rate 


 


Blood Pressure 128/65


 


O2 Sat by Pulse 94





Oximetry 














ED Medical Decision Making





- Lab Data


Result diagrams: 


                                 03/15/20 10:24





                                 03/15/20 10:24








                         Laboratory Results - last 24 hr











  03/15/20 03/15/20 03/15/20





  10:24 10:24 10:24


 


WBC  6.1  


 


RBC  4.66  


 


Hgb  12.8  


 


Hct  38.3  


 


MCV  82 L  


 


MCH  28  


 


MCHC  33  


 


RDW  16.4 H  


 


Plt Count  185  


 


Lymph % (Auto)  11.1 L  


 


Mono % (Auto)  7.1  


 


Eos % (Auto)  0.1  


 


Baso % (Auto)  0.4  


 


Lymph #  0.7 L  


 


Mono #  0.4  


 


Eos #  0.0  


 


Baso #  0.0  


 


Seg Neutrophils %  81.3 H  


 


Seg Neutrophils #  4.9  


 


Sodium   139 


 


Potassium   3.7 


 


Chloride   100.1 


 


Carbon Dioxide   26 


 


Anion Gap   17 


 


BUN   22 H 


 


Creatinine   0.9 


 


Estimated GFR   > 60 


 


BUN/Creatinine Ratio   24 


 


Glucose   125 H 


 


Calcium   9.1 


 


Total Bilirubin   0.40 


 


AST   13 


 


ALT   14 


 


Alkaline Phosphatase   44 


 


Total Protein   6.8 


 


Albumin   4.4 


 


Albumin/Globulin Ratio   1.8 


 


Urine Color   


 


Urine Turbidity   


 


Urine pH   


 


Ur Specific Gravity   


 


Urine Protein   


 


Urine Glucose (UA)   


 


Urine Ketones   


 


Urine Blood   


 


Urine Nitrite   


 


Urine Bilirubin   


 


Urine Urobilinogen   


 


Ur Leukocyte Esterase   


 


Urine WBC (Auto)   


 


Urine RBC (Auto)   


 


U Epithel Cells (Auto)   


 


Amorphous Crystals   


 


Urine Mucus   


 


Salicylates    < 0.3 L


 


Urine Opiates Screen   


 


Urine Methadone Screen   


 


Acetaminophen   


 


Ur Barbiturates Screen   


 


Ur Phencyclidine Scrn   


 


Ur Amphetamines Screen   


 


U Benzodiazepines Scrn   


 


Urine Cocaine Screen   


 


U Marijuana (THC) Screen   


 


Drugs of Abuse Note   


 


Plasma/Serum Alcohol   














  03/15/20 03/15/20 03/15/20





  10:24 10:24 10:50


 


WBC   


 


RBC   


 


Hgb   


 


Hct   


 


MCV   


 


MCH   


 


MCHC   


 


RDW   


 


Plt Count   


 


Lymph % (Auto)   


 


Mono % (Auto)   


 


Eos % (Auto)   


 


Baso % (Auto)   


 


Lymph #   


 


Mono #   


 


Eos #   


 


Baso #   


 


Seg Neutrophils %   


 


Seg Neutrophils #   


 


Sodium   


 


Potassium   


 


Chloride   


 


Carbon Dioxide   


 


Anion Gap   


 


BUN   


 


Creatinine   


 


Estimated GFR   


 


BUN/Creatinine Ratio   


 


Glucose   


 


Calcium   


 


Total Bilirubin   


 


AST   


 


ALT   


 


Alkaline Phosphatase   


 


Total Protein   


 


Albumin   


 


Albumin/Globulin Ratio   


 


Urine Color    Yellow


 


Urine Turbidity    Hazy


 


Urine pH    6.0


 


Ur Specific Gravity    1.018


 


Urine Protein    <15 mg/dl


 


Urine Glucose (UA)    Neg


 


Urine Ketones    Tr


 


Urine Blood    Neg


 


Urine Nitrite    Neg


 


Urine Bilirubin    Neg


 


Urine Urobilinogen    2.0


 


Ur Leukocyte Esterase    Neg


 


Urine WBC (Auto)    3.0


 


Urine RBC (Auto)    4.0


 


U Epithel Cells (Auto)    < 1.0


 


Amorphous Crystals    Few


 


Urine Mucus    2+


 


Salicylates   


 


Urine Opiates Screen   


 


Urine Methadone Screen   


 


Acetaminophen  < 5.0 L  


 


Ur Barbiturates Screen   


 


Ur Phencyclidine Scrn   


 


Ur Amphetamines Screen   


 


U Benzodiazepines Scrn   


 


Urine Cocaine Screen   


 


U Marijuana (THC) Screen   


 


Drugs of Abuse Note   


 


Plasma/Serum Alcohol   < 0.01 














  03/15/20





  10:50


 


WBC 


 


RBC 


 


Hgb 


 


Hct 


 


MCV 


 


MCH 


 


MCHC 


 


RDW 


 


Plt Count 


 


Lymph % (Auto) 


 


Mono % (Auto) 


 


Eos % (Auto) 


 


Baso % (Auto) 


 


Lymph # 


 


Mono # 


 


Eos # 


 


Baso # 


 


Seg Neutrophils % 


 


Seg Neutrophils # 


 


Sodium 


 


Potassium 


 


Chloride 


 


Carbon Dioxide 


 


Anion Gap 


 


BUN 


 


Creatinine 


 


Estimated GFR 


 


BUN/Creatinine Ratio 


 


Glucose 


 


Calcium 


 


Total Bilirubin 


 


AST 


 


ALT 


 


Alkaline Phosphatase 


 


Total Protein 


 


Albumin 


 


Albumin/Globulin Ratio 


 


Urine Color 


 


Urine Turbidity 


 


Urine pH 


 


Ur Specific Gravity 


 


Urine Protein 


 


Urine Glucose (UA) 


 


Urine Ketones 


 


Urine Blood 


 


Urine Nitrite 


 


Urine Bilirubin 


 


Urine Urobilinogen 


 


Ur Leukocyte Esterase 


 


Urine WBC (Auto) 


 


Urine RBC (Auto) 


 


U Epithel Cells (Auto) 


 


Amorphous Crystals 


 


Urine Mucus 


 


Salicylates 


 


Urine Opiates Screen  Presumptive negative


 


Urine Methadone Screen  Presumptive negative


 


Acetaminophen 


 


Ur Barbiturates Screen  Presumptive negative


 


Ur Phencyclidine Scrn  Presumptive negative


 


Ur Amphetamines Screen  Presumptive negative


 


U Benzodiazepines Scrn  Presumptive negative


 


Urine Cocaine Screen  Presumptive negative


 


U Marijuana (THC) Screen  Presumptive negative


 


Drugs of Abuse Note  Disclamer


 


Plasma/Serum Alcohol 














- Medical Decision Making





Mr. Story is a 61-year-old male with history of anxiety disorder and bipolar 

disorder who presents with suicidal ideation plan to cut his wrist.  He is 

medically clear for psychiatric care.  I have placed Mr. Story on involuntary 

hold utilizing 1013 protocol.





I have reviewed the labs obtained: CBC chemistry serum toxicology urine 

toxicology urinalysis all within normal limits


Critical care attestation.: 


If time is entered above; I have spent that time in minutes in the direct care 

of this critically ill patient, excluding procedure time.








ED Disposition


Clinical Impression: 


 Suicidal ideation, Anxiety





Condition: Stable


Referrals: 


PRIMARY CARE,MD [Primary Care Provider] - 3-5 Days

## 2020-05-04 NOTE — EMERGENCY DEPARTMENT REPORT
History of Present Illness





- General


Chief Complaint: Overdose


Stated Complaint: SUICIDAL IDEATIONS/MH


Time Seen by Provider: 05/04/20 05:16


Source: patient, EMS


Mode of arrival: Ambulatory


Limitations: No Limitations





- History of Present Illness


Initial Comments: 





64-year-old male with history of bipolar disorder presents to ED reporting 

overdose.  Patient states 2 hours prior to arrival he took he took approximately

10 Xanax tabs and approximately 10 pain pills.  Patient states he was feeling 

suicidal.  Patient reports he is the one that called EMS.  When asked why he 

called EMS, patient states, "Well, I was feeling suicidal, but I was not feeling

suicidal at the same time."


MD Complaint: intentional overdose


-: hour(s) (2)


Intent: suicide attempt


How Overdose Was Discovered: called 911


Associated Symptoms: depression


Treatments Prior to Arrival: none





- Related Data


                                Home Medications











 Medication  Instructions  Recorded  Confirmed  Last Taken


 


clonazePAM [KlonoPIN] 2 mg PO BID 04/27/15 08/20/17 06/16/17





    2 MG


 


Divalproex ER [DepaKOTE ER] 2,500 mg PO QDAY 08/20/17 08/20/17 Unknown


 


Venlafaxine HCl [Effexor Xr] 150 mg PO DAILY 08/20/17 08/20/17 Unknown


 


Venlafaxine [Effexor] 75 mg PO DAILY 08/20/17 08/20/17 Unknown


 


clonazePAM [KlonoPIN] 2 mg PO TID 08/20/17 08/20/17 Unknown








                                  Previous Rx's











 Medication  Instructions  Recorded  Last Taken  Type


 


Bupropion HCl [Wellbutrin XL] 300 mg PO QAM #30 tab.er.24h 03/29/15 3 Days Ago 

Rx





   ~05/15/15 











                                    Allergies











Allergy/AdvReac Type Severity Reaction Status Date / Time


 


buspirone HCl [From BuSpar] AdvReac  PSYCHOTIC Verified 11/30/18 16:59


 


haloperidol [From Haldol] AdvReac  HANDS Verified 11/30/18 16:59





   SWELL /  





   PSYCHOTIC  


 


haloperidol lactate AdvReac  HANDS Verified 11/30/18 16:59





[From Haldol]   SWELL /  





   PSYCHOTIC  


 


olanzapine [From Zyprexa] AdvReac  PSYCHOTIC Verified 11/30/18 16:59


 


quetiapine fumarate AdvReac  PSYCHOTIC Verified 11/30/18 16:59





[From Seroquel]     














ED Review of Systems


ROS: 


Stated complaint: SUICIDAL IDEATIONS/MH


Other details as noted in HPI





Comment: All other systems reviewed and negative


Gastrointestinal: denies: abdominal pain, vomiting


Psychiatric: depression, suicidal thoughts





ED Past Medical Hx





- Past Medical History


Previous Medical History?: Yes


Hx Hypertension: Yes


Hx Arthritis: Yes


Hx Psychiatric Treatment: Yes (anxiety, ptsd, depression, bipolar)


Additional medical history: CHRONIC BACK PAIN.  HARD OF HEARING.  BULGING DISCS





- Surgical History


Past Surgical History?: Yes


Additional Surgical History: L leg surgery (1995)





- Social History


Smoking Status: Current Every Day Smoker





- Medications


Home Medications: 


                                Home Medications











 Medication  Instructions  Recorded  Confirmed  Last Taken  Type


 


Bupropion HCl [Wellbutrin XL] 300 mg PO QAM #30 tab.er.24h 03/29/15 08/20/17 3 

Days Ago Rx





    ~05/15/15 


 


clonazePAM [KlonoPIN] 2 mg PO BID 04/27/15 08/20/17 06/16/17 History





    2 MG 


 


Divalproex ER [DepaKOTE ER] 2,500 mg PO QDAY 08/20/17 08/20/17 Unknown History


 


Venlafaxine HCl [Effexor Xr] 150 mg PO DAILY 08/20/17 08/20/17 Unknown History


 


Venlafaxine [Effexor] 75 mg PO DAILY 08/20/17 08/20/17 Unknown History


 


clonazePAM [KlonoPIN] 2 mg PO TID 08/20/17 08/20/17 Unknown History














ED Physical Exam





- General


Limitations: No Limitations


General appearance: alert, in no apparent distress





- Head


Head exam: Present: atraumatic, normocephalic





- Eye


Eye exam: Present: normal appearance, PERRL, EOMI





- ENT


ENT exam: Present: mucous membranes moist





- Neck


Neck exam: Present: normal inspection





- Respiratory


Respiratory exam: Present: normal lung sounds bilaterally.  Absent: respiratory 

distress





- Cardiovascular


Cardiovascular Exam: Present: regular rate, normal rhythm





- GI/Abdominal


GI/Abdominal exam: Present: soft.  Absent: distended, tenderness





- Extremities Exam


Extremities exam: Present: normal inspection





- Neurological Exam


Neurological exam: Present: alert, oriented X3





- Psychiatric


Psychiatric exam: Present: suicidal ideation





- Skin


Skin exam: Present: warm, dry, intact, normal color





ED Course


                                   Vital Signs











  05/04/20 05/04/20 05/04/20





  03:55 04:34 04:54


 


Temperature 98.7 F  


 


Pulse Rate 88 77 72


 


Respiratory 20 15 17





Rate   


 


Blood Pressure 110/67  


 


Blood Pressure   





[Left]   


 


O2 Sat by Pulse 92 93 97





Oximetry   














  05/04/20 05/04/20 05/04/20





  05:00 05:16 05:30


 


Temperature   


 


Pulse Rate 73 68 69


 


Respiratory 12 15 14





Rate   


 


Blood Pressure 106/68  


 


Blood Pressure   





[Left]   


 


O2 Sat by Pulse 95  





Oximetry   














  05/04/20 05/04/20 05/04/20





  05:46 06:00 06:16


 


Temperature   


 


Pulse Rate 68 66 67


 


Respiratory 18 18 16





Rate   


 


Blood Pressure   120/70


 


Blood Pressure   





[Left]   


 


O2 Sat by Pulse   97





Oximetry   














  05/04/20 05/04/20 05/04/20





  06:46 07:00 07:16


 


Temperature   


 


Pulse Rate 69 70 73


 


Respiratory 20 16 16





Rate   


 


Blood Pressure 120/70 106/53 120/70


 


Blood Pressure   





[Left]   


 


O2 Sat by Pulse 97 97 96





Oximetry   














  05/04/20 05/04/20 05/04/20





  07:30 08:00 08:30


 


Temperature   


 


Pulse Rate 76 69 70


 


Respiratory 17 18 15





Rate   


 


Blood Pressure 105/54 108/53 109/54


 


Blood Pressure   





[Left]   


 


O2 Sat by Pulse 100 97 97





Oximetry   














  05/04/20 05/04/20 05/04/20





  11:00 18:09 21:07


 


Temperature   97.5 F L


 


Pulse Rate 91 H 79 73


 


Respiratory 16 16 16





Rate   


 


Blood Pressure   


 


Blood Pressure 103/49 114/55 130/70





[Left]   


 


O2 Sat by Pulse 100 98 98





Oximetry   














  05/05/20 05/05/20 05/05/20





  04:58 08:09 20:45


 


Temperature 98.1 F 98.8 F 98.5 F


 


Pulse Rate 93 H 100 H 93 H


 


Respiratory 18 20 18





Rate   


 


Blood Pressure   160/71


 


Blood Pressure 136/76 141/67 





[Left]   


 


O2 Sat by Pulse 99 95 96





Oximetry   














ED Medical Decision Making





- Lab Data


Result diagrams: 


                                 05/04/20 04:08





                                 05/04/20 04:08





- EKG Data


-: EKG Interpreted by Me


EKG shows normal: sinus rhythm, axis, intervals, QRS complexes, ST-T waves


Rate: normal





- EKG Data


Interpretation: no acute changes





- Medical Decision Making





65 yo male presents to ED reporting intentional overdose.  States he took 

approximately 10 Xanax tabs and approximately 10 pain pills.  Pt is not drowsy 

or lethargic.  He is A&O x 3.  Labs are unremarkable, including normal tylenol 

level.  Pt is medically clear for mental health assessment if 2nd tylenol level 

is within normal limits.  Will dispo per psych.





- Differential Diagnosis


SI


Critical care attestation.: 


If time is entered above; I have spent that time in minutes in the direct care 

of this critically ill patient, excluding procedure time.








ED Disposition


Clinical Impression: 


 Suicidal ideation, Overdose





Disposition: DC/TX-70 ANOTHER TYPE HLTHCARE


Is pt being admited?: No


Condition: Stable


Referrals: 


PRIMARY CARE,MD [Primary Care Provider] - 3-5 Days

## 2020-05-05 NOTE — CONSULTATION
History of Present Illness





- Reason for Consult


Consult date: 05/05/20


Reason for consult: MHE


Requesting physician: BAILEY BARLOW





- Chief Complaint


Chief complaint: 





MHE





- History of Present Psychiatric Illness


Per Ed provider: 64-year-old male with history of bipolar disorder presents to 

ED reporting overdose.  Patient states 2 hours prior to arrival he took he took 

approximately 10 Xanax tabs and approximately 10 pain pills.  Patient states he 

was feeling suicidal.  Patient reports he is the one that called EMS.  When 

asked why he called EMS, patient states, "Well, I was feeling suicidal, but I 

was not feeling suicidal at the same time.





Per [facility]: Pt is a 63 yo  male presenting to ED for intentional OD.  Pt 

reportedly took 10 Xanax pills and 10 pain pills.  During ax, pt presented as 

depressed, with congruent affect and cooperative behaviors.  Pt is alert and 

oriented x 4.  Pt stated, "I'm having so much mental pain and I absolutely 

couldnt bare it anymore." Pt identified trigger of flashbacks of trauma he has 

experienced over his lifespan. Pt informed  that yesterday he endorsed 

SI, with an attempt via OD.  Pt reports hx of approximately 6 attempts.  Pt 

reports A/H commanding him to kill himself and telling him that he is going to 

die.  Pt reports tactile disturbances of feeling people hit him.  Pt identified 

a dx of Schizoaffective Disorder and PTSD. Pt reports connection to OP tx and 

compliance.


Pt currently resides in a transitional home.  Pt denies substance use.  Pt 

denies legal issues.  Pt reports decrease in sleep and appetite. HPI





HPI


Patient is a 64-year-old homeless retired  male with a past psychiatric

history of bipolar, schizophrenia, MDD, PTSD who presented to the ER with 

suicidal ideation and intentional overdose on Xanax pills and pain pills.  

Patient reports his been feeling suicidal for a couple of weeks now said 

suicidal ideation runs in the family and he has been like this almost all his 

life.  Patient then proceeded to saying that he is very confused and sick he 

does not want to talk any further.


 





PAST PSYCHIATRIC HISTORY


Diagnoses: bipolar, schizophrenia, MDD, PTSD


Suicide attempts or Self-harm behavior: Yes, tried to cut self


Prior psychiatric hospitalizations: Yes multiple


Substance Abuse history: Rx drugs


Previous psychiatric medications tried: Depakote


Outpatient treatment: Yes





PAST MEDICAL HISTORY: HTN





Family Psychiatric History: None reported or documented








SOCIAL HISTORY


Marital Status: Unknown


Living Arrangements: Homeless


Employment Status: Retired


Access to guns/weapons: None reported


Education: GED


History of Abuse: None reported


Legal History: None reported





REVIEW OF SYSTEMS


Constitutional: Negative for weight loss


ENT: Negative for stridor


Respiratory: Negative for cough or hemoptysis


All other systems reviewed and are negative





MENTAL STATUS EXAMINATION


General Appearance and Behavior: Age appropriate, dishelved poor hygiene,  lying

in bed, poor eye contact, uncooperative with questioning irritable


Cooperation: Withdrawn, Hostile and Guarded


Psychomotor Behavior:  unremarkable and within normal limits


Mood: No confused


Affect and affective range: decreased range,  irritable


Thought Process:Tangential,  Perseverative, Illogical,  Fragmented and Loose 

associations


Thought Content: Within reality


Speech:  paucity of speech, difficulty to understand,  confused and blocking


Intellectual Functioning: Average


Suicidal Ideation: Suicidal


Homicidal Ideation: Denies HI


Impulse Control: Impaired


Insight and Judgment: Limited insight and judgment, Impaired


Memory: Not accessible


Attention:   Divided attention impaired


Orientation: confused, lethargic





 Assessment and Plan 





- Psychiatric problem


(1) Schizoaffective disorder, bipolar type


Current Visit: Yes   Status: Acute   





(2) Major depressive disorder, severe


Current Visit: Yes   Status: Acute   





(3) Substance use disorder


Current Visit: Yes   Status: Acute   





RECOMMENDATIONS


MEDICATIONS: Deparkote 1000mg QHS, Venlafaxine 150 mg QDAY. Geodon and Phenergan

PRN for agitation.


Risks, benefits and alternatives of medications discussed with the patient, 

questions answered and consent obtained from patient.


PSYCHOTHERAPY: Supportive psychotherapy provided


MEDICAL: Per primary team


DELIRIUM PRECAUTIONS: Please re-orient patient frequently, keep lights on during

the day, and minimize benzodiazepines and opiates as these medications could 

worsen patient's confusion.


SAFETY SITTER: Per Medical team


DISPOSITION: Per primary team; indication for acute inpatient psychiatric 

hospitalization recommended


LEGAL STATUS:  1013


FOLLOW-UP: Will follow


Thank you for the consult.  Please contact with any questions and/or concerns.





Medications and Allergies


                                    Allergies











Allergy/AdvReac Type Severity Reaction Status Date / Time


 


buspirone HCl [From BuSpar] AdvReac  PSYCHOTIC Verified 11/30/18 16:59


 


haloperidol [From Haldol] AdvReac  HANDS Verified 11/30/18 16:59





   SWELL /  





   PSYCHOTIC  


 


haloperidol lactate AdvReac  HANDS Verified 11/30/18 16:59





[From Haldol]   SWELL /  





   PSYCHOTIC  


 


olanzapine [From Zyprexa] AdvReac  PSYCHOTIC Verified 11/30/18 16:59


 


quetiapine fumarate AdvReac  PSYCHOTIC Verified 11/30/18 16:59





[From Seroquel]     











                                Home Medications











 Medication  Instructions  Recorded  Confirmed  Last Taken  Type


 


Bupropion HCl [Wellbutrin XL] 300 mg PO QAM #30 tab.er.24h 03/29/15 08/20/17 3 

Days Ago Rx





    ~05/15/15 


 


clonazePAM [KlonoPIN] 2 mg PO BID 04/27/15 08/20/17 06/16/17 History





    2 MG 


 


Divalproex ER [DepaKOTE ER] 2,500 mg PO QDAY 08/20/17 08/20/17 Unknown History


 


Venlafaxine HCl [Effexor Xr] 150 mg PO DAILY 08/20/17 08/20/17 Unknown History


 


Venlafaxine [Effexor] 75 mg PO DAILY 08/20/17 08/20/17 Unknown History


 


clonazePAM [KlonoPIN] 2 mg PO TID 08/20/17 08/20/17 Unknown History














Mental Status Exam





- Vital signs


                                Last Vital Signs











Temp  98.8 F   05/05/20 08:09


 


Pulse  100 H  05/05/20 08:09


 


Resp  20   05/05/20 08:09


 


BP  141/67   05/05/20 08:09


 


Pulse Ox  95   05/05/20 08:09














Results


Result Diagrams: 


                                 05/04/20 04:08





                                 05/04/20 04:08


All other labs normal.








Assessment and Plan





- Psychiatric problem


(1) Schizoaffective disorder, bipolar type


Current Visit: Yes   Status: Acute   





(2) Major depressive disorder, severe


Current Visit: Yes   Status: Acute   





(3) Substance use disorder


Current Visit: Yes   Status: Acute

## 2020-11-12 NOTE — EVENT NOTE
ED Screening Note


Date of service: 11/12/20


Time: 17:30


ED Screening Note: 


Patient here for attempted suicide in hallucinations


Cut left arm and went to sleep hoping to bleed out per patient


States his insurance requires preauthorization for his psych meds and he has not

taken them





This initial assessment/diagnostic orders/clinical plan/treatment(s) is/are 

subject to change based on patients health status, clinical progression and re-

assessment by fellow clinical providers in the ED. Further treatment and workup 

at subsequent clinical providers discretion. Patient/guardian urged not to elope

from the ED as their condition may be serious if not clinically assessed and 

managed. 





Initial orders include: 


1013


Mental health eval


Labs

## 2020-11-12 NOTE — EMERGENCY DEPARTMENT REPORT
<MICHAEL YING - Last Filed: 20 22:23>





ED Psych HPI





- General


Chief Complaint: Psych


Stated Complaint: SI/HI


Time Seen by Provider: 20 17:28


Source: patient


Mode of arrival: Ambulatory





- History of Present Illness


Initial Comments: 





Patient is a 65-year-old  male who has past medical history of 

depression and suicidality.  Patient states he has been off of his Invega for 

the past month and his symptoms have worsened.  Is patient last night cut 

himself in the left arm superficially numerous times (at least 20 times) in the 

hopes that he would bleed out during his sleep.  Patient woke up this morning 

and because he was not dead came to the hospital for evaluation.  States he does

hear auditory hallucinations which are command.  He denies any homicidal 

ideations.





- Related Data


                                Home Medications











 Medication  Instructions  Recorded  Confirmed  Last Taken


 


amLODIPine 10 mg PO DAILY 20 Unknown








                                  Previous Rx's











 Medication  Instructions  Recorded  Last Taken  Type


 


Divalproex ER [Depakote ER] 500 mg PO BID #60 tablet 20 Unknown Rx


 


Venlafaxine [Effexor] 300 mg PO DAILY #30 tablet 20 Unknown Rx


 


buPROPion [Wellbutrin] 150 mg PO DAILY #30 20 Unknown Rx


 


clonazePAM [KlonoPIN] 0.5 mg PO BID #30 tablet 20 Unknown Rx











                                    Allergies











Allergy/AdvReac Type Severity Reaction Status Date / Time


 


buspirone HCl [From BuSpar] AdvReac  PSYCHOTIC Verified 18 16:59


 


haloperidol [From Haldol] AdvReac  HANDS Verified 18 16:59





   SWELL /  





   PSYCHOTIC  


 


haloperidol lactate AdvReac  HANDS Verified 18 16:59





[From Haldol]   SWELL /  





   PSYCHOTIC  


 


olanzapine [From Zyprexa] AdvReac  PSYCHOTIC Verified 18 16:59


 


quetiapine fumarate AdvReac  PSYCHOTIC Verified 18 16:59





[From Seroquel]     














ED Review of Systems


Comment: All other systems reviewed and negative





ED Past Medical Hx





- Past Medical History


Previous Medical History?: Yes


Hx Hypertension: Yes


Hx Arthritis: Yes


Hx Psychiatric Treatment: Yes (anxiety, ptsd, depression, bipolar)


Additional medical history: CHRONIC BACK PAIN.  HARD OF HEARING.  BULGING DISCS





- Surgical History


Past Surgical History?: Yes


Additional Surgical History: L leg surgery ()





- Social History


Smoking Status: Current Every Day Smoker


Substance Use Type: None





- Medications


Home Medications: 


                                Home Medications











 Medication  Instructions  Recorded  Confirmed  Last Taken  Type


 


amLODIPine 10 mg PO DAILY 20 Unknown History


 


Divalproex ER [Depakote ER] 500 mg PO BID #60 tablet 20  Unknown Rx


 


Venlafaxine [Effexor] 300 mg PO DAILY #30 tablet 20  Unknown Rx


 


buPROPion [Wellbutrin] 150 mg PO DAILY #30 20  Unknown Rx


 


clonazePAM [KlonoPIN] 0.5 mg PO BID #30 tablet 20  Unknown Rx














ED Physical Exam





- General


Limitations: No Limitations


General appearance: alert, in no apparent distress





- Head


Head exam: Present: atraumatic, normocephalic





- Eye


Eye exam: Present: normal appearance





- ENT


ENT exam: Present: mucous membranes moist





- Neck


Neck exam: Present: normal inspection





- Respiratory


Respiratory exam: Present: normal lung sounds bilaterally.  Absent: respiratory 

distress, wheezes, rales, rhonchi





- Cardiovascular


Cardiovascular Exam: Present: regular rate, normal rhythm.  Absent: normal heart

 sounds, systolic murmur, diastolic murmur, rubs, gallop





- GI/Abdominal


GI/Abdominal exam: Present: soft, normal bowel sounds.  Absent: distended, 

tenderness, guarding





- Rectal


Rectal exam: Present: deferred





- Extremities Exam


Extremities exam: Present: normal inspection





- Expanded Upper Extremity Exam


  ** Left


Forearm Wrist exam: Present: other (Multiple superficial approximately 4 cm in 

length linear abrasions and superficial lacerations along the ventral surface of

 the wrist and forearm.  These appear to be in the early stages of healing and 

there is no active bleeding.)





- Back Exam


Back exam: Present: normal inspection





- Neurological Exam


Neurological exam: Present: alert, oriented X3





- Psychiatric


Psychiatric exam: Present: normal affect, normal mood





- Skin


Skin exam: Present: warm, dry, intact, normal color.  Absent: rash





ED Course





- Reevaluation(s)


Reevaluation #1: 





20 22:35


None of the wounds appear to need suturing.  Wound was covered with Xeroform 

gauze and then wrapped.  Patient medical cleared at this time





ED Medical Decision Making





- Lab Data


Result diagrams: 


                                 20 17:54





                                 20 17:54








                                   Lab Results











  20 Range/Units





  17:54 17:54 17:54 


 


WBC  8.1    (4.5-11.0)  K/mm3


 


RBC  4.18    (3.65-5.03)  M/mm3


 


Hgb  11.3 L    (11.8-15.2)  gm/dl


 


Hct  34.4 L    (35.5-45.6)  %


 


MCV  82 L    (84-94)  fl


 


MCH  27 L    (28-32)  pg


 


MCHC  33    (32-34)  %


 


RDW  14.7    (13.2-15.2)  %


 


Plt Count  252    (140-440)  K/mm3


 


Lymph % (Auto)  21.5    (13.4-35.0)  %


 


Mono % (Auto)  15.3 H    (0.0-7.3)  %


 


Eos % (Auto)  1.3    (0.0-4.3)  %


 


Baso % (Auto)  0.5    (0.0-1.8)  %


 


Lymph # (Auto)  1.8    (1.2-5.4)  K/mm3


 


Mono # (Auto)  1.2 H    (0.0-0.8)  K/mm3


 


Eos # (Auto)  0.1    (0.0-0.4)  K/mm3


 


Baso # (Auto)  0.0    (0.0-0.1)  K/mm3


 


Seg Neutrophils %  61.4    (40.0-70.0)  %


 


Seg Neutrophils #  5.0    (1.8-7.7)  K/mm3


 


Sodium   140   (137-145)  mmol/L


 


Potassium   3.8   (3.6-5.0)  mmol/L


 


Chloride   103.9   ()  mmol/L


 


Carbon Dioxide   24   (22-30)  mmol/L


 


Anion Gap   16   mmol/L


 


BUN   14   (9-20)  mg/dL


 


Creatinine   1.1   (0.8-1.3)  mg/dL


 


Estimated GFR   > 60   ml/min


 


BUN/Creatinine Ratio   13   %


 


Glucose   86   ()  mg/dL


 


Calcium   8.7   (8.4-10.2)  mg/dL


 


Total Bilirubin   < 0.20   (0.1-1.2)  mg/dL


 


AST   14   (5-40)  units/L


 


ALT   10   (7-56)  units/L


 


Alkaline Phosphatase   47   ()  units/L


 


Total Protein   6.4   (6.3-8.2)  g/dL


 


Albumin   4.0   (3.9-5)  g/dL


 


Albumin/Globulin Ratio   1.7   %


 


Urine Color     (Yellow)  


 


Urine Turbidity     (Clear)  


 


Urine pH     (5.0-7.0)  


 


Ur Specific Gravity     (1.003-1.030)  


 


Urine Protein     (Negative)  mg/dL


 


Urine Glucose (UA)     (Negative)  mg/dL


 


Urine Ketones     (Negative)  mg/dL


 


Urine Blood     (Negative)  


 


Urine Nitrite     (Negative)  


 


Urine Bilirubin     (Negative)  


 


Urine Urobilinogen     (<2.0)  mg/dL


 


Ur Leukocyte Esterase     (Negative)  


 


Urine WBC (Auto)     (0.0-6.0)  /HPF


 


Urine RBC (Auto)     (0.0-6.0)  /HPF


 


Urine Mucus     /HPF


 


Salicylates    < 0.3 L  (2.8-20.0)  mg/dL


 


Urine Opiates Screen     


 


Urine Methadone Screen     


 


Acetaminophen     (10.0-30.0)  ug/mL


 


Ur Barbiturates Screen     


 


Ur Phencyclidine Scrn     


 


Ur Amphetamines Screen     


 


U Benzodiazepines Scrn     


 


Urine Cocaine Screen     


 


U Marijuana (THC) Screen     


 


Drugs of Abuse Note     


 


Plasma/Serum Alcohol     (0-0.07)  %














  20 Range/Units





  17:54 17:54 20:48 


 


WBC     (4.5-11.0)  K/mm3


 


RBC     (3.65-5.03)  M/mm3


 


Hgb     (11.8-15.2)  gm/dl


 


Hct     (35.5-45.6)  %


 


MCV     (84-94)  fl


 


MCH     (28-32)  pg


 


MCHC     (32-34)  %


 


RDW     (13.2-15.2)  %


 


Plt Count     (140-440)  K/mm3


 


Lymph % (Auto)     (13.4-35.0)  %


 


Mono % (Auto)     (0.0-7.3)  %


 


Eos % (Auto)     (0.0-4.3)  %


 


Baso % (Auto)     (0.0-1.8)  %


 


Lymph # (Auto)     (1.2-5.4)  K/mm3


 


Mono # (Auto)     (0.0-0.8)  K/mm3


 


Eos # (Auto)     (0.0-0.4)  K/mm3


 


Baso # (Auto)     (0.0-0.1)  K/mm3


 


Seg Neutrophils %     (40.0-70.0)  %


 


Seg Neutrophils #     (1.8-7.7)  K/mm3


 


Sodium     (137-145)  mmol/L


 


Potassium     (3.6-5.0)  mmol/L


 


Chloride     ()  mmol/L


 


Carbon Dioxide     (22-30)  mmol/L


 


Anion Gap     mmol/L


 


BUN     (9-20)  mg/dL


 


Creatinine     (0.8-1.3)  mg/dL


 


Estimated GFR     ml/min


 


BUN/Creatinine Ratio     %


 


Glucose     ()  mg/dL


 


Calcium     (8.4-10.2)  mg/dL


 


Total Bilirubin     (0.1-1.2)  mg/dL


 


AST     (5-40)  units/L


 


ALT     (7-56)  units/L


 


Alkaline Phosphatase     ()  units/L


 


Total Protein     (6.3-8.2)  g/dL


 


Albumin     (3.9-5)  g/dL


 


Albumin/Globulin Ratio     %


 


Urine Color    Anjana  (Yellow)  


 


Urine Turbidity    Clear  (Clear)  


 


Urine pH    6.0  (5.0-7.0)  


 


Ur Specific Gravity    1.017  (1.003-1.030)  


 


Urine Protein    <15 mg/dl  (Negative)  mg/dL


 


Urine Glucose (UA)    Neg  (Negative)  mg/dL


 


Urine Ketones    Tr  (Negative)  mg/dL


 


Urine Blood    Neg  (Negative)  


 


Urine Nitrite    Neg  (Negative)  


 


Urine Bilirubin    Neg  (Negative)  


 


Urine Urobilinogen    2.0  (<2.0)  mg/dL


 


Ur Leukocyte Esterase    Neg  (Negative)  


 


Urine WBC (Auto)    2.0  (0.0-6.0)  /HPF


 


Urine RBC (Auto)    4.0  (0.0-6.0)  /HPF


 


Urine Mucus    Few  /HPF


 


Salicylates     (2.8-20.0)  mg/dL


 


Urine Opiates Screen     


 


Urine Methadone Screen     


 


Acetaminophen  5.0 L    (10.0-30.0)  ug/mL


 


Ur Barbiturates Screen     


 


Ur Phencyclidine Scrn     


 


Ur Amphetamines Screen     


 


U Benzodiazepines Scrn     


 


Urine Cocaine Screen     


 


U Marijuana (THC) Screen     


 


Drugs of Abuse Note     


 


Plasma/Serum Alcohol   < 0.01   (0-0.07)  %














  20 Range/Units





  20:48 


 


WBC   (4.5-11.0)  K/mm3


 


RBC   (3.65-5.03)  M/mm3


 


Hgb   (11.8-15.2)  gm/dl


 


Hct   (35.5-45.6)  %


 


MCV   (84-94)  fl


 


MCH   (28-32)  pg


 


MCHC   (32-34)  %


 


RDW   (13.2-15.2)  %


 


Plt Count   (140-440)  K/mm3


 


Lymph % (Auto)   (13.4-35.0)  %


 


Mono % (Auto)   (0.0-7.3)  %


 


Eos % (Auto)   (0.0-4.3)  %


 


Baso % (Auto)   (0.0-1.8)  %


 


Lymph # (Auto)   (1.2-5.4)  K/mm3


 


Mono # (Auto)   (0.0-0.8)  K/mm3


 


Eos # (Auto)   (0.0-0.4)  K/mm3


 


Baso # (Auto)   (0.0-0.1)  K/mm3


 


Seg Neutrophils %   (40.0-70.0)  %


 


Seg Neutrophils #   (1.8-7.7)  K/mm3


 


Sodium   (137-145)  mmol/L


 


Potassium   (3.6-5.0)  mmol/L


 


Chloride   ()  mmol/L


 


Carbon Dioxide   (22-30)  mmol/L


 


Anion Gap   mmol/L


 


BUN   (9-20)  mg/dL


 


Creatinine   (0.8-1.3)  mg/dL


 


Estimated GFR   ml/min


 


BUN/Creatinine Ratio   %


 


Glucose   ()  mg/dL


 


Calcium   (8.4-10.2)  mg/dL


 


Total Bilirubin   (0.1-1.2)  mg/dL


 


AST   (5-40)  units/L


 


ALT   (7-56)  units/L


 


Alkaline Phosphatase   ()  units/L


 


Total Protein   (6.3-8.2)  g/dL


 


Albumin   (3.9-5)  g/dL


 


Albumin/Globulin Ratio   %


 


Urine Color   (Yellow)  


 


Urine Turbidity   (Clear)  


 


Urine pH   (5.0-7.0)  


 


Ur Specific Gravity   (1.003-1.030)  


 


Urine Protein   (Negative)  mg/dL


 


Urine Glucose (UA)   (Negative)  mg/dL


 


Urine Ketones   (Negative)  mg/dL


 


Urine Blood   (Negative)  


 


Urine Nitrite   (Negative)  


 


Urine Bilirubin   (Negative)  


 


Urine Urobilinogen   (<2.0)  mg/dL


 


Ur Leukocyte Esterase   (Negative)  


 


Urine WBC (Auto)   (0.0-6.0)  /HPF


 


Urine RBC (Auto)   (0.0-6.0)  /HPF


 


Urine Mucus   /HPF


 


Salicylates   (2.8-20.0)  mg/dL


 


Urine Opiates Screen  Presumptive negative  


 


Urine Methadone Screen  Presumptive negative  


 


Acetaminophen   (10.0-30.0)  ug/mL


 


Ur Barbiturates Screen  Presumptive negative  


 


Ur Phencyclidine Scrn  Presumptive negative  


 


Ur Amphetamines Screen  Presumptive negative  


 


U Benzodiazepines Scrn  Presumptive positive  


 


Urine Cocaine Screen  Presumptive negative  


 


U Marijuana (THC) Screen  Presumptive negative  


 


Drugs of Abuse Note  Disclamer  


 


Plasma/Serum Alcohol   (0-0.07)  %














ED Disposition


Clinical Impression: 


 Suicidal ideation, Schizoaffective disorder, bipolar type





Disposition: DC-01 TO HOME OR SELFCARE


Condition: Stable


Additional Instructions: 


                       OUTPATIENT MENTAL HEALTH RESOURCES





[facility]         Gabriel Isabel MD:


[address]                [address]


[place] [address]               [place] [address]


 [phone]





[facility]:              [address]               [address] [place] [address]            [place] [address]


[phone]





[facility]:      [facility]: 


[address] [place] [address]  [address]      [address] [place] [address] [address]


[place] [address]               [facility] [id number] [phone]





[facility]:     Bony Geiger MD:


[address]                 [address] [id number] [phone]





[facility]:   


[address]


[place], [place] [address] [phone]


                  **[place] CRISIS AND ACCESS LINE: [phone]**


Prescriptions: 


Divalproex ER [Depakote ER] 500 mg PO BID #60 tablet


Venlafaxine [Effexor] 300 mg PO DAILY #30 tablet


clonazePAM [KlonoPIN] 0.5 mg PO BID #30 tablet


buPROPion [Wellbutrin] 150 mg PO DAILY #30


Referrals: 


PRIMARY CARE,MD [Primary Care Provider] - 3-5 Days





<EUGENIO YING - Last Filed: 20 16:08>





ED Review of Systems


ROS: 


Stated complaint: SI/HI


Other details as noted in HPI








ED Course





                                   Vital Signs











  20





  17:20 02:11 09:33


 


Temperature 98.9 F 97.5 F L 97.6 F


 


Pulse Rate 109 H 74 81


 


Respiratory 18 16 





Rate   


 


Blood Pressure 106/66 94/56 141/72





[Right]   


 


O2 Sat by Pulse 95 97 





Oximetry   














  20





  20:23 03:00 08:24


 


Temperature 98.0 F 97.6 F 97.9 F


 


Pulse Rate 82 70 86


 


Respiratory 18 16 20





Rate   


 


Blood Pressure 120/71 110/63 131/65





[Right]   


 


O2 Sat by Pulse 99 98 98





Oximetry   














ED Medical Decision Making





- Lab Data


Result diagrams: 


                                 20 17:54





                                 20 17:54





- Medical Decision Making





1013 has been rescinded by our psychiatric team.  Patient will be discharged.


Critical care attestation.: 


If time is entered above; I have spent that time in minutes in the direct care 

of this critically ill patient, excluding procedure time.








ED Disposition


Is pt being admited?: No


Does the pt Need Aspirin: No
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
## 2020-11-14 NOTE — PROGRESS NOTE
Subjective





- Reason for Consult


Consult date: 11/14/20


Reason for consult: MHE


Requesting physician: MICHAEL YING





- Chief Complaint


Chief complaint: 





Psych progress 


Patient seen this a.m., today patient reports feeling better, says he is no 

longer SI and no longer depressed as was when he initially came in.  Patient 

endorses medication efficacy and wishes to be discharged to go back home 





REVIEW OF SYSTEMS


Constitutional: Negative for weight loss


ENT: Negative for stridor


Respiratory: Negative for cough or hemoptysis


All other systems reviewed and are negative





MENTAL STATUS EXAMINATION


General Appearance and Behavior: Age appropriate, good hygiene, wearing 

appropriate clothes, good eye contact, cooperative polite with questioning.


Cooperation: Participating/engaged


Psychomotor Behavior:  unremarkable and within normal limits


Mood: Good


Affect and affective range: congruent with mood


Thought Process: Fluent/Logical, 


Thought Content: Within reality,


Speech: Normal volume, Regular rate and rhythm, 


Intellectual Functioning: Average


Suicidal Ideation: Denies SI


Homicidal Ideation: Denies HI


Impulse Control: Unimpaired


Insight and Judgment: Normal insight and judgment,


Memory: Normal, 


Attention: Normal,


Orientation: Alert, oriented,





 Assessment and Plan 





- Psychiatric problem


(1) Schizoaffective disorder, bipolar type


Current Visit: Yes   Status: Acute   





(2) Major depressive disorder, severe


Current Visit: Yes   Status: Acute   





(3) Substance use disorder


Current Visit: Yes   Status: Acute   





RECOMMENDATIONS


MEDICATIONS: Deparkote 500mg BID, Venlafaxine 300 mg QDAY.


Risks, benefits and alternatives of medications discussed with the patient, 

questions answered and consent obtained from patient.


PSYCHOTHERAPY: Supportive psychotherapy provided


MEDICAL: Per primary team


DELIRIUM PRECAUTIONS: Please re-orient patient frequently, keep lights on during

the day, and minimize benzodiazepines and opiates as these medications could 

worsen patient's confusion.


SAFETY SITTER: Per Medical team


DISPOSITION: No  indication for acute inpatient psychiatric hospitalization lolly

mmended


LEGAL STATUS:  1013 rescinded


FOLLOW-UP: Will sign off


Thank you for the consult.  Please contact with any questions and/or concerns.





Mental Status Exam





- Vital signs


                                Last Vital Signs











Temp  97.9 F   11/14/20 08:24


 


Pulse  86   11/14/20 08:24


 


Resp  20   11/14/20 08:24


 


BP  131/65   11/14/20 08:24


 


Pulse Ox  98   11/14/20 08:24

## 2021-02-16 NOTE — EMERGENCY DEPARTMENT REPORT
HPI





- General


Chief Complaint: Psych


Time Seen by Provider: 02/16/21 19:12





- HPI


HPI: 





Room 30








The patient is a 65-year-old male present with a chief complaint of suicidal 

ideation and depression.  Patient states she has been depressed and suffering 

from auditory hallucinations which bother him all night.  The patient states 

over the past 2 weeks he has consumed a total of 270 Ultram (the last 6-year-old

which he consumed between 2/11-2/14).  The patient states he also consumed a 

total of 90 Ativan between 2/11 - 2/14.  The patient states he would just 

continue to eat "eat" these pills and would intermittently be in a "stupor."  

The patient states if he leaves this hospital he is going to go cut his wrist.  

When asked how he is feeling the patient states he just feels "shaky" inside and

is requesting Klonopin





ED Past Medical Hx





- Past Medical History


Previous Medical History?: Yes


Hx Hypertension: Yes


Hx Arthritis: Yes


Hx Psychiatric Treatment: Yes (anxiety, ptsd, depression, bipolar)


Additional medical history: CHRONIC BACK PAIN.  HARD OF HEARING.  BULGING DISCS





- Surgical History


Additional Surgical History: L leg surgery (1995)





- Family History


Family history: no significant





- Social History


Smoking Status: Current Every Day Smoker (Cigars)


Substance Use Type: None (Denies illicit drug use)





- Medications


Home Medications: 


                                Home Medications











 Medication  Instructions  Recorded  Confirmed  Last Taken  Type


 


amLODIPine 10 mg PO DAILY 11/12/20 02/16/21 02/16/21 07:00 History


 


Divalproex ER [Depakote ER] 500 mg PO BID #60 tablet 11/14/20 02/16/21 02/16/21 

07:00 Rx


 


Venlafaxine [Effexor] 300 mg PO DAILY #30 tablet 11/14/20 02/16/21 07:00 Rx


 


buPROPion [Wellbutrin] 150 mg PO DAILY #30 11/14/20 02/16/21 02/16/21 07:00 Rx


 


clonazePAM [KlonoPIN] 0.5 mg PO BID #30 tablet 11/14/20 02/16/21 07:00 Rx


 


Gabapentin 900 mg PO TID 02/16/21 02/16/21 02/16/21 07:00 History


 


Lisinopril 20 mg PO DAILY 02/16/21 02/16/21 02/16/21 07:00 History


 


Topiramate 60 mg PO BID 02/16/21 02/16/21 02/16/21 07:00 History


 


amLODIPine 20 mg PO DAILY 02/16/21 02/16/21 02/16/21 07:00 History


 


hydrOXYzine PAMOATE 50 mg PO TID PRN 02/16/21 02/16/21 02/16/21 07:00 History


 


traMADoL 50 mg PO QID PRN 02/16/21 02/16/21 02/16/21 07:00 History


 


traZODone 150 mg PO DAILY PRN 02/17/21 02/17/21 02/16/21 19:00 History














ED Review of Systems


ROS: 


Stated complaint: SUICIDE ATTEMPT


Other details as noted in HPI





Constitutional: no symptoms reported


Eyes: denies: eye pain


ENT: denies: throat pain


Respiratory: no symptoms reported


Cardiovascular: denies: chest pain


Endocrine: no symptoms reported


Gastrointestinal: denies: abdominal pain


Genitourinary: denies: dysuria


Musculoskeletal: denies: back pain


Neurological: other (Feels "shaky").  denies: headache


Psychiatric: depression, suicidal thoughts





Physical Exam





- Physical Exam


Vital Signs: 


                                   Vital Signs











  02/16/21





  16:27


 


Temperature 97.6 F


 


Pulse Rate 83


 


Respiratory 16





Rate 


 


Blood Pressure 125/50


 


O2 Sat by Pulse 98





Oximetry 











Physical Exam: 





GENERAL: The patient is well-developed well-nourished male sitting in chair 

appearing mildly emotionally upset


HEENT: Normocephalic.  Atraumatic.  Extraocular motions are intact.  Patient has

 moist mucous membranes.


NECK: Supple.  Trachea midline


CHEST/LUNGS: Clear to auscultation.  There is no respiratory distress noted.


HEART/CARDIOVASCULAR: Regular.  There is no tachycardia.  There is no gallop rub

 or murmur.


ABDOMEN: Abdomen is soft, nontender.  Patient has normal bowel sounds.  There is

 no abdominal distention.


SKIN: There is no rash.  There is no edema.  There is no diaphoresis.


NEURO: The patient is awake, alert, and oriented.  The patient is cooperative.  

The patient has no focal neurologic deficits.  The patient has normal speech.  

There is no tremulousness


MUSCULOSKELETAL:  There is no evidence of acute injury.





ED Course


                                   Vital Signs











  02/16/21





  16:27


 


Temperature 97.6 F


 


Pulse Rate 83


 


Respiratory 16





Rate 


 


Blood Pressure 125/50


 


O2 Sat by Pulse 98





Oximetry 














- Consultations


Consultation #1: 





02/16/21 19:27


Poison control called


02/16/21 20:05


Case discussed with poison control.  States patient may be cleared for psych as 

medications are out of his system now.  Recommends checking labs and EKG for QRS

 widening or QTC greater than 500











ED Medical Decision Making





- Lab Data


Result diagrams: 


                                 02/16/21 19:23





                                 02/16/21 19:23





- EKG Data


-: EKG Interpreted by Me


EKG shows normal: sinus rhythm


Rate: normal





- EKG Data


When compared to previous EKG there are: previous EKG unavailable


Interpretation: other (QRS 98, QTc 454)





- Differential Diagnosis


Suicidal ideation


Critical care attestation.: 


If time is entered above; I have spent that time in minutes in the direct care 

of this critically ill patient, excluding procedure time.








ED Disposition


Clinical Impression: 


 Suicidal ideation, Intentional drug overdose





Disposition: DC/TX-[facility]/PSY UNIT


Is pt being admited?: No


Does the pt Need Aspirin: No


Condition: Stable


Referrals: 


NAPOLEON BRIGGS MD [Primary Care Provider] - 3-5 Days

## 2021-02-17 NOTE — CONSULTATION
History of Present Illness





- Reason for Consult


Consult date: 02/17/21


Requesting physician: KACIE BEATTY





- History of Present Illness


66 YO Male with HTN, OA, CHANG, PTSD, Depression, Vascular Dementia with 

Behavioral Disturbance, Nicotine Dependence, Bipolar disorder admitted to [facility] for Psychiatric stabilization. Consult placed by Dr. Beatty for 

medical m anagement. Pt seen and evaluated in the emergency room.  Patient 

denies fever, chills, chest pain, palpitation, productive cough, recent ill 

contacts, or known exposure to COVID-19.  No prior admission for review.  No 

reported nursing events.





Past History


Past Medical History: arthritis, hypertension


Past Surgical History: Other (Left leg surgery)


Social history: , smoking


Family history: hypertension





Medications and Allergies


                                    Allergies











Allergy/AdvReac Type Severity Reaction Status Date / Time


 


buspirone HCl [From BuSpar] AdvReac  PSYCHOTIC Verified 11/30/18 16:59


 


haloperidol [From Haldol] AdvReac  HANDS Verified 11/30/18 16:59





   SWELL /  





   PSYCHOTIC  


 


haloperidol lactate AdvReac  HANDS Verified 11/30/18 16:59





[From Haldol]   SWELL /  





   PSYCHOTIC  


 


olanzapine [From Zyprexa] AdvReac  PSYCHOTIC Verified 11/30/18 16:59


 


quetiapine fumarate AdvReac  PSYCHOTIC Verified 11/30/18 16:59





[From Seroquel]     











                                Home Medications











 Medication  Instructions  Recorded  Confirmed  Last Taken  Type


 


amLODIPine 10 mg PO DAILY 11/12/20 02/16/21 02/16/21 07:00 History


 


Divalproex ER [Depakote ER] 500 mg PO BID #60 tablet 11/14/20 02/16/21 02/16/21 

07:00 Rx


 


Venlafaxine [Effexor] 300 mg PO DAILY #30 tablet 11/14/20 02/16/21 07:00 Rx


 


buPROPion [Wellbutrin] 150 mg PO DAILY #30 11/14/20 02/16/21 02/16/21 07:00 Rx


 


clonazePAM [KlonoPIN] 0.5 mg PO BID #30 tablet 11/14/20 02/16/21 07:00 Rx


 


Gabapentin 900 mg PO TID 02/16/21 02/16/21 02/16/21 07:00 History


 


Lisinopril 20 mg PO DAILY 02/16/21 02/16/21 02/16/21 07:00 History


 


Topiramate 60 mg PO BID 02/16/21 02/16/21 02/16/21 07:00 History


 


amLODIPine 20 mg PO DAILY 02/16/21 02/16/21 02/16/21 07:00 History


 


hydrOXYzine PAMOATE 50 mg PO TID PRN 02/16/21 02/16/21 02/16/21 07:00 History


 


traMADoL 50 mg PO QID PRN 02/16/21 02/16/21 02/16/21 07:00 History


 


Paliperidone [Invega] 9 mg PO QHS 02/17/21 02/17/21 Unknown History


 


traZODone 150 mg PO DAILY PRN 02/17/21 02/17/21 02/16/21 19:00 History











Active Meds: 


Active Medications





Amlodipine Besylate (Amlodipine 10 Mg Tab)  10 mg PO DAILY [facility]


Clonazepam (Clonazepam 0.5 Mg Tab)  0.25 mg PO BID J[facility]


Divalproex Sodium (Divalproex Er 500 Mg Tab)  500 mg PO BID JAMES


Hydroxyzine Pamoate (Hydroxyzine Pamoate 50 Mg Cap)  50 mg PO TID PRN


   PRN Reason: Anxiety


Lisinopril (Lisinopril 20 Mg Tab)  20 mg PO QDAY JAMES


Miscellaneous Medication (Topiramate)  60 mg PO BID [facility]


Nicotine (Nicotine 21 Mg/24 Hr Patch)  21 mg TD QDAY JAMES


Trazodone HCl (Trazodone 50 Mg Tab)  150 mg PO QHS PRN


   PRN Reason: Sleep


Venlafaxine HCl (Venlafaxine 75 Mg Tab)  300 mg PO DAILY [facility]











Review of Systems


Constitutional: no weight loss, no weight gain, no fever, no chills


Ears, nose, mouth and throat: no ear pain, no ear discharge, no tinnitis, no 

decreased hearing


Cardiovascular: no chest pain, no palpitations, no edema, no syncope


Respiratory: no cough, no excessive sputum


Gastrointestinal: no nausea, no diarrhea, no change in bowel habits, no 

hematemesis


Genitourinary Male: no hematuria, no flank pain, no discharge, no urinary 

frequency, no urinary hesitancy


Rectal: no pain, no bleeding


Musculoskeletal: no neck pain, no shooting arm pain, no low back pain, no leg 

numbness/tingling


Integumentary: no rash, no pruritis, no wounds, no jaundice


Neurological: no transient paralysis, no seizures, no tremors


Psychiatric: anxiety, hallucinations, depression


Endocrine: no cold intolerance, no excessive thirst, no polydipsia, no weight 

change


Hematologic/Lymphatic: no easy bruising, no lymphedema


Allergic/Immunologic: no persistent infections





Exam





- Constitutional


General appearance: Present: no acute distress, well-nourished





- EENT


Eyes: Present: PERRL


ENT: hearing intact, clear oral mucosa





- Neck


Neck: Present: supple, normal ROM





- Respiratory


Respiratory effort: normal


Respiratory: bilateral: CTA





- Cardiovascular


Heart Sounds: Present: S1 & S2.  Absent: rub, click





- Extremities


Extremities: pulses symmetrical, No edema


Peripheral Pulses: within normal limits





- Abdominal


General gastrointestinal: Present: soft, non-tender, non-distended, normal bowel

 sounds


Male genitourinary: Present: normal





- Integumentary


Integumentary: Present: clear, warm, dry





- Musculoskeletal


Musculoskeletal: gait normal, strength equal bilaterally





- Psychiatric


Psychiatric: appropriate mood/affect, intact judgment & insight





- Neurologic


Neurologic: CNII-XII intact, moves all extremities





Assessment and Plan





- Patient Problems


(1) Vascular dementia with behavioral disturbance


Current Visit: Yes   Status: Acute   


Plan to address problem: 


Verbal prompting, verbal redirection, supportive care, benzodiazepine therapy as

 clinically indicated.








(2) Hypertension


Current Visit: Yes   Status: Acute   


Qualifiers: 


   Hypertension type: essential hypertension   Qualified Code(s): I10 - 

Essential (primary) hypertension   


Plan to address problem: 


Monitor blood pressure every shift, continue medical management








(3) Nicotine dependence


Current Visit: Yes   Status: Acute   


Qualifiers: 


   Nicotine product type: cigarettes   Substance use status: in withdrawal   

Qualified Code(s): F17.213 - Nicotine dependence, cigarettes, with withdrawal   


Plan to address problem: 


Smoking cessation counseling, supportive care, behavior change counseling, +15 

minutes








(4) Upper respiratory tract infection


Current Visit: Yes   Status: Acute   


Plan to address problem: 


Empirical antibiotic therapy, supportive care.

## 2021-02-17 NOTE — CONSULTATION
History of Present Illness





- Reason for Consult


Consult date: 02/17/21


Reason for consult: Suicidal attempt





- History of Present Psychiatric Illness


Per ER note: "The patient is a 65-year-old male present with a chief complaint 

of suicidal ideation and depression.  Patient states she has been depressed and 

suffering from auditory hallucinations which bother him all night.  The patient 

states over the past 2 weeks he has consumed a total of 270 Ultram (the last 

6-year-old which he consumed between 2/11-2/14).  The patient states he also 

consumed a total of 90 Ativan between 2/11 - 2/14.  The patient states he would 

just continue to eat "eat" these pills and would intermittently be in a 

"stupor."  The patient states if he leaves this hospital he is going to go cut 

his wrist.  When asked how he is feeling the patient states he just feels 

"shaky" inside and is requesting Klonopin."





During my interview with 66y/o Lucas Story, he is lying in bed awake. He is a/o

x 3. He is anxious. The patient says he attempted to end his life by taking 

pills. He says "I'm very depressed and just don't feel the need to live." The 

patient says he hears voices saying "I'm not going to be happy until I die." He 

denies any drug use, alcohol, or nicotine. He says he has a history of "bipolar,

ptsd, depression and anxiety."





PAST PSYCHIATRIC HISTORY


Diagnoses: bipolar, anxiety, MDD, PTSD


Suicide attempts or Self-harm behavior: Yes


Prior psychiatric hospitalizations: Yes multiple


Substance Abuse history: Rx drugs


Previous psychiatric medications tried: Depakote


Outpatient treatment: Yes





PAST MEDICAL HISTORY: HTN





Family Psychiatric History: None reported or documented





SOCIAL HISTORY


Marital Status: 


Living Arrangements: Group home


Employment Status: Disabled


Access to guns/weapons: Denies


Education: GED


History of Abuse: Denies


Legal History: Denies





REVIEW OF SYSTEMS


Constitutional: Negative for weight loss


ENT: Negative for stridor


Respiratory: Negative for cough or hemoptysis


All other systems reviewed and are negative





MENTAL STATUS EXAMINATION


General Appearance and Behavior: Age appropriate, dressed appropriately, lying 

in bed, poor eye contact, cooperative with questioning, anxious


Cooperation: Withdrawn


Psychomotor Behavior: unremarkable and within normal limits


Mood: Depressed


Affect and affective range: restricted


Thought Process: goal directed


Thought Content: hallucinations, SI, hopelessness


Speech: normal tone and pace


Intellectual Functioning: Average


Suicidal Ideation: Suicidal


Homicidal Ideation: Denies HI


Hallucinations: Auditory


Delusions: None elicited


Impulse Control: Impaired


Insight and Judgment: Limited insight and judgment, Impaired


Memory: Normal


Attention: Normal


Orientation:a/o





 Assessment and Plan 


(1) Bipolar Disorder, Current Episode Depressed with Psychotic Features


Current Visit: Yes   Status: Acute   


(2) Generalized Anxiety Disorder


Current Visit: Yes   Status: Acute  





RECOMMENDATIONS


Continue Home depakote


Decrease home klonopin 0.25mg po BID


Risks, benefits and alternatives of medications discussed with the patient, 

questions answered and consent obtained from patient.


PSYCHOTHERAPY: Supportive psychotherapy provided


MEDICAL: Per primary team


DELIRIUM PRECAUTIONS: Please re-orient patient frequently, keep lights on during

the day, and minimize benzodiazepines and opiates as these medications could 

worsen patient's confusion.


SAFETY SITTER: Per Medical team


DISPOSITION: Per primary team; indication for acute inpatient psychiatric 

hospitalization recommended


LEGAL STATUS:  1013


FOLLOW-UP: Will follow


Thank you for the consult.  Please contact with any questions and/or concerns.


Case staffed with Dr. Easton








Medications and Allergies


                                    Allergies











Allergy/AdvReac Type Severity Reaction Status Date / Time


 


buspirone HCl [From BuSpar] AdvReac  PSYCHOTIC Verified 11/30/18 16:59


 


haloperidol [From Haldol] AdvReac  HANDS Verified 11/30/18 16:59





   SWELL /  





   PSYCHOTIC  


 


haloperidol lactate AdvReac  HANDS Verified 11/30/18 16:59





[From Haldol]   SWELL /  





   PSYCHOTIC  


 


olanzapine [From Zyprexa] AdvReac  PSYCHOTIC Verified 11/30/18 16:59


 


quetiapine fumarate AdvReac  PSYCHOTIC Verified 11/30/18 16:59





[From Seroquel]     











                                Home Medications











 Medication  Instructions  Recorded  Confirmed  Last Taken  Type


 


amLODIPine 10 mg PO DAILY 11/12/20 02/16/21 02/16/21 07:00 History


 


Divalproex ER [Depakote ER] 500 mg PO BID #60 tablet 11/14/20 02/16/21 02/16/21 

07:00 Rx


 


Venlafaxine [Effexor] 300 mg PO DAILY #30 tablet 11/14/20 02/16/21 07:00 Rx


 


buPROPion [Wellbutrin] 150 mg PO DAILY #30 11/14/20 02/16/21 02/16/21 07:00 Rx


 


clonazePAM [KlonoPIN] 0.5 mg PO BID #30 tablet 11/14/20 02/16/21 07:00 Rx


 


Gabapentin 900 mg PO TID 02/16/21 02/16/21 02/16/21 07:00 History


 


Lisinopril 20 mg PO DAILY 02/16/21 02/16/21 02/16/21 07:00 History


 


Topiramate 60 mg PO BID 02/16/21 02/16/21 02/16/21 07:00 History


 


amLODIPine 20 mg PO DAILY 02/16/21 02/16/21 02/16/21 07:00 History


 


hydrOXYzine PAMOATE 50 mg PO TID PRN 02/16/21 02/16/21 02/16/21 07:00 History


 


traMADoL 50 mg PO QID PRN 02/16/21 02/16/21 02/16/21 07:00 History


 


traZODone 150 mg PO DAILY PRN 02/17/21 02/17/21 02/16/21 19:00 History











Active Meds: 


Active Medications





Trazodone HCl (Trazodone 50 Mg Tab)  150 mg PO QHS PRN


   PRN Reason: Sleep


   Last Admin: 02/17/21 01:24 Dose:  150 mg


   Documented by: 











Mental Status Exam





- Vital signs


                                Last Vital Signs











Temp  97.8 F   02/17/21 07:18


 


Pulse  98 H  02/17/21 07:18


 


Resp  20   02/17/21 07:18


 


BP  102/49   02/17/21 07:18


 


Pulse Ox  99   02/17/21 07:18














Results


Result Diagrams: 


                                 02/16/21 19:23





                                 02/16/21 19:23


                              Abnormal lab results











  02/16/21 02/16/21 02/16/21 Range/Units





  19:23 19:23 19:23 


 


WBC  15.4 H    (4.5-11.0)  K/mm3


 


MCV  81 L    (84-94)  fl


 


Lymph % (Auto)  10.1 L    (13.4-35.0)  %


 


Mono % (Auto)  9.8 H    (0.0-7.3)  %


 


Mono # (Auto)  1.5 H    (0.0-0.8)  K/mm3


 


Seg Neutrophils %  79.2 H    (40.0-70.0)  %


 


Seg Neutrophils #  12.2 H    (1.8-7.7)  K/mm3


 


PT   11.7 L   (12.2-14.9)  Sec.


 


Sodium    135 L  (137-145)  mmol/L


 


BUN    23 H  (9-20)  mg/dL


 


Creatinine    1.5 H  (0.8-1.3)  mg/dL


 


Glucose    106 H  ()  mg/dL


 


Salicylates     (2.8-20.0)  mg/dL


 


Acetaminophen     (10.0-30.0)  ug/mL


 


Valproic Acid     ()  ug/mL














  02/16/21 02/16/21 Range/Units





  19:23 19:23 


 


WBC    (4.5-11.0)  K/mm3


 


MCV    (84-94)  fl


 


Lymph % (Auto)    (13.4-35.0)  %


 


Mono % (Auto)    (0.0-7.3)  %


 


Mono # (Auto)    (0.0-0.8)  K/mm3


 


Seg Neutrophils %    (40.0-70.0)  %


 


Seg Neutrophils #    (1.8-7.7)  K/mm3


 


PT    (12.2-14.9)  Sec.


 


Sodium    (137-145)  mmol/L


 


BUN    (9-20)  mg/dL


 


Creatinine    (0.8-1.3)  mg/dL


 


Glucose    ()  mg/dL


 


Salicylates  0.4 L   (2.8-20.0)  mg/dL


 


Acetaminophen   5.0 L  (10.0-30.0)  ug/mL


 


Valproic Acid  49.2 L   ()  ug/mL








All other labs normal.

## 2021-02-18 NOTE — HISTORY AND PHYSICAL REPORT
GP History & Physical





- History of Present Illness


Date of admission: 02/17/21


Date of Examination: 02/18/21


Reason for Admission: Danger to self, Failure of Outpatient Treatment, Severe 

anxiety/depression


History of Present Illness: 


Per ER note: "The patient is a 65-year-old male present with a chief complaint 

of suicidal ideation and depression.  Patient states she has been depressed and 

suffering from auditory hallucinations which bother him all night.  The patient 

states over the past 2 weeks he has consumed a total of 270 Ultram (the last 

6-year-old which he consumed between 2/11-2/14).  The patient states he also 

consumed a total of 90 Ativan between 2/11 - 2/14.  The patient states he would 

just continue to eat "eat" these pills and would intermittently be in a 

"stupor."  The patient states if he leaves this hospital he is going to go cut 

his wrist.  When asked how he is feeling the patient states he just feels 

"shaky" inside and is requesting Klonopin."





Ekta Larios is a 66y/o patient I initially saw in the ED, he is lying in bed 

awake. He is a/o x 3. He is anxious. The patient says he attempted to end his 

life by taking pills. The patient verbalizes being very anxious and depressed. 

He says he is hearing voices and unable to tell what they are saying. He says 

they are mumbling. He still verbalizes SI with no plan. The patient says he did 

not sleep well.





PAST PSYCHIATRIC HISTORY


Diagnoses: bipolar, anxiety, MDD, PTSD


Suicide attempts or Self-harm behavior: Yes


Prior psychiatric hospitalizations: Yes multiple


Substance Abuse history: Rx drugs


Previous psychiatric medications tried: Depakote


Outpatient treatment: Yes





PAST MEDICAL HISTORY: HTN





Family Psychiatric History: None reported or documented





SOCIAL HISTORY


Marital Status: 


Living Arrangements: Group home


Employment Status: Disabled


Access to guns/weapons: Denies


Education: GED


History of Abuse: Denies


Legal History: Denies





REVIEW OF SYSTEMS


Constitutional: Negative for weight loss


ENT: Negative for stridor


Respiratory: Negative for cough or hemoptysis


All other systems reviewed and are negative





MENTAL STATUS EXAMINATION


General Appearance and Behavior: Age appropriate, dressed appropriately, lying 

in bed, poor eye contact, cooperative with questioning, anxious


Cooperation: Withdrawn


Psychomotor Behavior: unremarkable and within normal limits


Mood: Depressed


Affect and affective range: restricted


Thought Process: goal directed


Thought Content: hallucinations, SI, hopelessness


Speech: normal tone and pace


Intellectual Functioning: Average


Suicidal Ideation: Suicidal with no plan


Homicidal Ideation: Denies HI


Hallucinations: Auditory


Delusions: None elicited


Impulse Control: Impaired


Insight and Judgment: Limited insight and judgment, Impaired


Memory: Normal


Attention: Normal


Orientation:a/o





 Assessment and Plan 


(1) Bipolar Disorder, Current Episode Depressed with Psychotic Features


Current Visit: Yes   Status: Acute   


(2) Generalized Anxiety Disorder


Current Visit: Yes   Status: Acute  





RECOMMENDATIONS


Treatment Plan


  Patient admitted for inpatient psychiatric evaluation, medication adjustment 

and close monitoring


 The patient's behavior, mood, sleep and appetite will be closely monitored.


 Patient enrolled in individual and group therapeutic sessions and encouraged to

attend.


 Patient provided with a safe and structured environment.


 Patient's physical health needs will be addressed by the Hospitalist. 

Hospitalist Consulted


 Labs including CBC, CMP, Lipid profile and Hemoglobin A1C levels ordered for 

baseline reference


 Social Assessment will be completed and the  will work with 

patient and family to ensure a suitable and safe disposition


 Medication adjustment will be made as clinically indicated


   Continue Home meds and previous medications started


    Start Risperidone 0.25mg po BID


 Usual Wellness Restoration/Preservation:


 - Start Trazodone 50 mg po QHS & 50 mg po QHS PRN between 10 PM & 2 AM for 

insomnia


 - Start Melatonin 5 mg po QHS to promote circadian rhythm


 - Start Omega-3 for brain health, reduce impulsivity, and as adjunctive 

treatment for mood disorder, continue upon discharge given overall benefits.


 - Start B1 prophylaxis with 200 mg po for 5 days


 The patient agreed on the treatment plan, understood the risk, benefit, 

alternative treatment, potential consequence of no treatment, and gave informed 

consent.


Initial Certification


I certify that the inpatient psychiatric services are required for treatment 

that could reasonably be expected to improve the patient's condition of 

hallucinations, depression and suicidal thoughts.


 Estimated days: 7


 Post hospital care: primary care provider, psychiatric provider








Legal Status: Voluntary


Patient Problems: 


Current Active Problems





Hypertension (Acute)


Nicotine dependence (Acute)


Upper respiratory tract infection (Acute)


Vascular dementia with behavioral disturbance (Acute)








Reaction to Hospitalization: Accepting





Medications and Allergies


                                    Allergies











Allergy/AdvReac Type Severity Reaction Status Date / Time


 


buspirone HCl [From BuSpar] AdvReac  PSYCHOTIC Verified 11/30/18 16:59


 


haloperidol [From Haldol] AdvReac  HANDS Verified 11/30/18 16:59





   SWELL /  





   PSYCHOTIC  


 


haloperidol lactate AdvReac  HANDS Verified 11/30/18 16:59





[From Haldol]   SWELL /  





   PSYCHOTIC  


 


olanzapine [From Zyprexa] AdvReac  PSYCHOTIC Verified 11/30/18 16:59


 


quetiapine fumarate AdvReac  PSYCHOTIC Verified 11/30/18 16:59





[From Seroquel]     











                                Home Medications











 Medication  Instructions  Recorded  Confirmed  Last Taken  Type


 


amLODIPine 10 mg PO DAILY 11/12/20 02/16/21 02/16/21 07:00 History


 


Divalproex ER [Depakote ER] 500 mg PO BID #60 tablet 11/14/20 02/16/21 02/16/21 

07:00 Rx


 


Venlafaxine [Effexor] 300 mg PO DAILY #30 tablet 11/14/20 02/16/21 07:00 Rx


 


buPROPion [Wellbutrin] 150 mg PO DAILY #30 11/14/20 02/16/21 02/16/21 07:00 Rx


 


clonazePAM [KlonoPIN] 0.5 mg PO BID #30 tablet 11/14/20 02/16/21 07:00 Rx


 


Gabapentin 900 mg PO TID 02/16/21 02/16/21 02/16/21 07:00 History


 


Lisinopril 20 mg PO DAILY 02/16/21 02/16/21 02/16/21 07:00 History


 


Topiramate 60 mg PO BID 02/16/21 02/16/21 02/16/21 07:00 History


 


amLODIPine 20 mg PO DAILY 02/16/21 02/16/21 02/16/21 07:00 History


 


hydrOXYzine PAMOATE 50 mg PO TID PRN 02/16/21 02/16/21 02/16/21 07:00 History


 


traMADoL 50 mg PO QID PRN 02/16/21 02/16/21 02/16/21 07:00 History


 


Paliperidone [Invega] 9 mg PO QHS 02/17/21 02/17/21 Unknown History


 


traZODone 150 mg PO DAILY PRN 02/17/21 02/17/21 02/16/21 19:00 History











Active Meds: 


Active Medications





Amlodipine Besylate (Amlodipine 10 Mg Tab)  10 mg PO DAILY JAMES


Clonazepam (Clonazepam 0.5 Mg Tab)  0.25 mg PO BID JAMES


   Last Admin: 02/17/21 21:32 Dose:  0.25 mg


   Documented by: 


Divalproex Sodium (Divalproex Er 500 Mg Tab)  500 mg PO BID [facility], [facility]


   Last Admin: 02/17/21 21:33 Dose:  500 mg


   Documented by: 


Hydroxyzine Pamoate (Hydroxyzine Pamoate 50 Mg Cap)  50 mg PO TID PRN


   PRN Reason: Anxiety


Levofloxacin (Levofloxacin 500 Mg Tab)  500 mg PO Q24HR [facility], 1928–1983; Protocol


   Stop: 02/21/21 21:42


   Last Admin: 02/17/21 20:56 Dose:  500 mg


   Documented by: 


Lisinopril (Lisinopril 20 Mg Tab)  20 mg PO QDAY [facility], [facility]


Miscellaneous Medication (Topiramate)  60 mg PO BID [facility], [facility]


Nicotine (Nicotine 21 Mg/24 Hr Patch)  21 mg TD QDAY [facility], [facility]


   Last Admin: 02/17/21 21:34 Dose:  Not Given


   Documented by: 


Trazodone HCl (Trazodone 50 Mg Tab)  150 mg PO QHS PRN


   PRN Reason: Sleep


   Last Admin: 02/17/21 21:33 Dose:  150 mg


   Documented by: 


Venlafaxine HCl (Venlafaxine 75 Mg Tab)  300 mg PO DAILY [facility], [facility]











Results





- Results


Labs/Vitals: 


                             Laboratory Last Values











Creatinine  1.0 mg/dL (0.8-1.3)   02/18/21  06:26    


 


Estimated GFR  > 60 ml/min  02/18/21  06:26    


 


POC Glucose  83 mg/dL ()   02/18/21  06:48    


 


Hemoglobin A1c  5.8 % (4-6)   02/17/21  18:49    


 


Triglycerides  140 mg/dL (2-149)   02/17/21  18:49    


 


Cholesterol  171 mg/dL ()   02/17/21  18:49    


 


LDL Cholesterol Direct  115 mg/dL ()   02/17/21  18:49    


 


HDL Cholesterol  46 mg/dL (40-59)   02/17/21  18:49    


 


Cholesterol/HDL Ratio  3.71 %  02/17/21  18:49    


 


TSH  1.840 mlU/mL (0.270-4.200)   02/17/21  18:49    








                                Last Vital Signs











Temp  99.2 F   02/17/21 22:00


 


Pulse  100 H  02/17/21 22:00


 


Resp  18   02/17/21 22:00


 


BP  121/66   02/17/21 22:00


 


Pulse Ox  98   02/17/21 22:00














Physical Examination





- Constitutional


Vitals: 


                                   Vital Signs











Temp Pulse Resp BP Pulse Ox


 


 99.2 F   100 H  18   121/66   98 


 


 02/17/21 22:00  02/17/21 22:00  02/17/21 22:00  02/17/21 22:00  02/17/21 22:00








                           Temperature -Last 24 Hours











Temperature                    99.2 F

















Mental Status Exam





- Vital signs


                                Last Vital Signs











Temp  99.2 F   02/17/21 22:00


 


Pulse  100 H  02/17/21 22:00


 


Resp  18   02/17/21 22:00


 


BP  121/66   02/17/21 22:00


 


Pulse Ox  98   02/17/21 22:00














Physician Certification





- Certification Statement


Physician Certification Statement: 


This is an acknowledgement statement that EKTA LARIOS is a 65 year old M who 

requires inpatient psychiatric admission for treatment which could reasonably be

 expected to improve the patient's condition for 





Estimated period of time patient will need to remain in the hospital: [ ]





Plan for post-hospital care: [ ]

## 2021-02-19 NOTE — PROGRESS NOTE
Subjective


Date of service: 02/19/21


Principal diagnosis: Bipolar disorder


Subjective Comment: 





Psych progress 


Patient seen this a.m. patient reports that he is feeling sad and down, endorses

suicidal ideation, and auditory hallucinations.  Patient reports he is feeling 

fear because of thoughts of wanting to die.





REVIEW OF SYSTEMS


Constitutional: Negative for weight loss


ENT: Negative for stridor


Respiratory: Negative for cough or hemoptysis


All other systems reviewed and are negative





MENTAL STATUS EXAMINATION


General Appearance and Behavior: Age appropriate, good hygiene, wearing 

appropriate clothes, good eye contact, cooperative polite with questioning.


Cooperation: Participating/engaged


Psychomotor Behavior:  unremarkable and within normal limits


Mood: Good


Affect and affective range: congruent with mood


Thought Process: ilogical, 


Thought Content: paranoid


Speech: Normal volume, Regular rate and rhythm, 


Intellectual Functioning: Average


Suicidal Ideation:  SI


Homicidal Ideation: Denies HI


Impulse Control: Unimpaired


Insight and Judgment: Normal insight and judgment,


Memory: Normal, 


Attention: Normal,


Orientation: Alert, oriented,





 Assessment and Plan 





- Psychiatric problem


(1) Schizoaffective disorder, bipolar type


Current Visit: Yes   Status: Acute   





(2) Major depressive disorder, severe


Current Visit: Yes   Status: Acute   





(3) Substance use disorder


Current Visit: Yes   Status: Acute   





Treatment Plan


Start patient on Invega p.o. 9 mg and discontinue trazodone


  Patient admitted for inpatient psychiatric evaluation, medication adjustment 

and close monitoring


 The patient's behavior, mood, sleep and appetite will be closely monitored.


 Patient enrolled in individual and group therapeutic sessions and encouraged to

attend.


 Patient provided with a safe and structured environment.


 Patient's physical health needs will be addressed by the Hospitalist. 

Hospitalist Consulted


 Labs including CBC, CMP, Lipid profile and Hemoglobin A1C levels ordered for 

baseline reference


 Social Assessment will be completed and the  will work with 

patient and family to ensure a suitable and safe disposition


 Medication adjustment will be made as clinically indicated


 Usual Wellness Restoration/Preservation:


 - Start Trazodone 50 mg po QHS & 50 mg po QHS PRN between 10 PM & 2 AM for 

insomnia


 - Start Melatonin 5 mg po QHS to promote circadian rhythm


 - Start Omega-3 for brain health, reduce impulsivity, and as adjunctive 

treatment for mood disorder, continue upon discharge given overall benefits.


 - Start B1 prophylaxis with 200 mg po for 5 days


 The patient agreed on the treatment plan, understood the risk, benefit, 

alternative treatment, potential consequence of no treatment, and gave informed 

consent.


Initial Certification





 Inpatient psych services:





 I certify that the inpatient psychiatric services are required for treatment 

that could reasonably be expected to improve the patient's condition.


 Estimated days: 7


 Post hospital care: primary care provider, psychiatric provider





Medications and Allergies


                                    Allergies











Allergy/AdvReac Type Severity Reaction Status Date / Time


 


buspirone HCl [From BuSpar] AdvReac  PSYCHOTIC Verified 11/30/18 16:59


 


haloperidol [From Haldol] AdvReac  HANDS Verified 11/30/18 16:59





   SWELL /  





   PSYCHOTIC  


 


haloperidol lactate AdvReac  HANDS Verified 11/30/18 16:59





[From Haldol]   SWELL /  





   PSYCHOTIC  


 


olanzapine [From Zyprexa] AdvReac  PSYCHOTIC Verified 11/30/18 16:59


 


quetiapine fumarate AdvReac  PSYCHOTIC Verified 11/30/18 16:59





[From Seroquel]     











                                Home Medications











 Medication  Instructions  Recorded  Confirmed  Last Taken  Type


 


amLODIPine 10 mg PO DAILY 11/12/20 02/16/21 02/16/21 07:00 History


 


Divalproex ER [Depakote ER] 500 mg PO BID #60 tablet 11/14/20 02/16/21 02/16/21 

07:00 Rx


 


Venlafaxine [Effexor] 300 mg PO DAILY #30 tablet 11/14/20 02/16/21 07:00 Rx


 


buPROPion [Wellbutrin] 150 mg PO DAILY #30 11/14/20 02/16/21 02/16/21 07:00 Rx


 


clonazePAM [KlonoPIN] 0.5 mg PO BID #30 tablet 11/14/20 02/16/21 07:00 Rx


 


Gabapentin 900 mg PO TID 02/16/21 02/16/21 02/16/21 07:00 History


 


Lisinopril 20 mg PO DAILY 02/16/21 02/16/21 02/16/21 07:00 History


 


Topiramate 60 mg PO BID 02/16/21 02/16/21 02/16/21 07:00 History


 


amLODIPine 20 mg PO DAILY 02/16/21 02/16/21 02/16/21 07:00 History


 


hydrOXYzine PAMOATE 50 mg PO TID PRN 02/16/21 02/16/21 02/16/21 07:00 History


 


traMADoL 50 mg PO QID PRN 02/16/21 02/16/21 02/16/21 07:00 History


 


Paliperidone [Invega] 9 mg PO QHS 02/17/21 02/17/21 Unknown History


 


traZODone 150 mg PO DAILY PRN 02/17/21 02/17/21 02/16/21 19:00 History











Active Meds: 


Active Medications





Amlodipine Besylate (Amlodipine 10 Mg Tab)  10 mg PO DAILY [facility]


   Last Admin: 02/19/21 10:13 Dose:  Not Given


   Documented by: 


Clonazepam (Clonazepam 0.5 Mg Tab)  0.25 mg PO BID [facility]


   Last Admin: 02/19/21 10:08 Dose:  0.25 mg


   Documented by: 


Divalproex Sodium (Divalproex Er 500 Mg Tab)  500 mg PO BID [facility]


   Last Admin: 02/19/21 10:08 Dose:  500 mg


   Documented by: 


Hydroxyzine Pamoate (Hydroxyzine Pamoate 50 Mg Cap)  50 mg PO TID PRN


   PRN Reason: Anxiety


Levofloxacin (Levofloxacin 500 Mg Tab)  500 mg PO Q24HR [facility]; Protocol


   Stop: 02/21/21 21:42


   Last Admin: 02/19/21 10:08 Dose:  500 mg


   Documented by: 


Lisinopril (Lisinopril 20 Mg Tab)  20 mg PO QDAY [facility]


   Last Admin: 02/19/21 10:25 Dose:  Not Given


   Documented by: 


Nicotine (Nicotine 21 Mg/24 Hr Patch)  21 mg TD QDAY [facility]


   Last Admin: 02/19/21 10:11 Dose:  Not Given


   Documented by: 


Risperidone (Risperidone 0.25 Mg Tab)  0.25 mg PO BID [facility]


   Last Admin: 02/19/21 10:22 Dose:  0.25 mg


   Documented by: 


Topiramate (Topiramate Tab 25 Mg Tab)  25 mg PO BID [facility]


   Last Admin: 02/19/21 10:07 Dose:  25 mg


   Documented by: 


Trazodone HCl (Trazodone 50 Mg Tab)  150 mg PO QHS PRN


   PRN Reason: Sleep


   Last Admin: 02/17/21 21:33 Dose:  150 mg


   Documented by: 


Venlafaxine HCl (Venlafaxine 75 Mg Tab)  300 mg PO DAILY [facility]


   Last Admin: 02/19/21 10:22 Dose:  300 mg


   Documented by: 











Results





- Results


Labs/Vitals: 


                             Laboratory Last Values











Creatinine  1.0 mg/dL (0.8-1.3)   02/18/21  06:26    


 


Estimated GFR  > 60 ml/min  02/18/21  06:26    


 


POC Glucose  83 mg/dL ()   02/18/21  06:48    


 


Hemoglobin A1c  5.8 % (4-6)   02/17/21  18:49    


 


Triglycerides  140 mg/dL (2-149)   02/17/21  18:49    


 


Cholesterol  171 mg/dL ()   02/17/21  18:49    


 


LDL Cholesterol Direct  115 mg/dL ()   02/17/21  18:49    


 


HDL Cholesterol  46 mg/dL (40-59)   02/17/21  18:49    


 


Cholesterol/HDL Ratio  3.71 %  02/17/21  18:49    


 


TSH  1.840 mlU/mL (0.270-4.200)   02/17/21  18:49    








                                Last Vital Signs











Temp  98.0 F   02/19/21 09:13


 


Pulse  92 H  02/19/21 10:25


 


Resp  16   02/19/21 09:13


 


BP  83/50   02/19/21 10:25


 


Pulse Ox  94   02/19/21 09:13

## 2021-02-20 NOTE — PROGRESS NOTE
Subjective


Date of service: 02/20/21


Principal diagnosis: Bipolar disorder


Subjective Comment: 


Psych Nurse: pt spent the evening laying in bed, presents with flat affect and 

depressed mood, able to make needs known, reported not feeling good, when pt was

asked to elaborate, he states, "don't worry, I will be alright". pt consumed 

100% of bedtime snack, medication compliant, denies si/hi, denies a/v/h, no 

distress noted, will continue to monitor for safety.





Psych progress 


Patient seen this a.m. reports not feeling too good states he has been getting 

well less than the normal dose of his anxiety medication of lorazepam which is 

usually 1 mg 3 times a day.  Patient reports his sleep has been on and off just 

because of his worsening anxiety but denies any suicidal ideation or homicidal 

ideation at this





Reason for inpatient admission:  Planning for safety discharge. 





REVIEW OF SYSTEMS


Constitutional: Negative for weight loss


ENT: Negative for stridor


Respiratory: Negative for cough or hemoptysis


All other systems reviewed and are negative





MENTAL STATUS EXAMINATION


General Appearance and Behavior: Age appropriate, good hygiene, wearing 

appropriate clothes, good eye contact, cooperative polite with questioning.


Cooperation: Participating/engaged


Psychomotor Behavior:  unremarkable and within normal limits


Mood: not good, anxious


Affect and affective range: congruent with mood


Thought Process: ilogical, 


Thought Content: paranoid


Speech: Normal volume, Regular rate and rhythm, 


Intellectual Functioning: Average


Suicidal Ideation:  deneis


Homicidal Ideation: Denies HI


Impulse Control: Unimpaired


Insight and Judgment: Normal insight and judgment,


Memory: Normal, 


Attention: Normal,


Orientation: Alert, oriented,





 Assessment and Plan 





- Psychiatric problem


(1) Schizoaffective disorder, bipolar type


Current Visit: Yes   Status: Acute   





(2) Major depressive disorder, severe


Current Visit: Yes   Status: Acute   





(3) Substance use disorder


Current Visit: Yes   Status: Acute   





Treatment Plan


Start patient on Invega p.o. 9 mg and discontinue trazodone


  Patient admitted for inpatient psychiatric evaluation, medication adjustment 

and close monitoring


 The patient's behavior, mood, sleep and appetite will be closely monitored.


 Patient enrolled in individual and group therapeutic sessions and encouraged to

attend.


 Patient provided with a safe and structured environment.


 Patient's physical health needs will be addressed by the Hospitalist. 

Hospitalist Consulted


 Labs including CBC, CMP, Lipid profile and Hemoglobin A1C levels ordered for 

baseline reference


 Social Assessment will be completed and the  will work with 

patient and family to ensure a suitable and safe disposition


 Medication adjustment will be made as clinically indicated


 Usual Wellness Restoration/Preservation:


 - Start Trazodone 50 mg po QHS & 50 mg po QHS PRN between 10 PM & 2 AM for 

insomnia


 - Start Melatonin 5 mg po QHS to promote circadian rhythm


 - Start Omega-3 for brain health, reduce impulsivity, and as adjunctive 

treatment for mood disorder, continue upon discharge given overall benefits.


 - Start B1 prophylaxis with 200 mg po for 5 days


 The patient agreed on the treatment plan, understood the risk, benefit, 

alternative treatment, potential consequence of no treatment, and gave informed 

consent.


Initial Certification





 Inpatient psych services:





 I certify that the inpatient psychiatric services are required for treatment 

that could reasonably be expected to improve the patient's condition.


 Estimated days: 4


 Post hospital care: primary care provider, psychiatric provider





Medications and Allergies


                                    Allergies











Allergy/AdvReac Type Severity Reaction Status Date / Time


 


buspirone HCl [From BuSpar] AdvReac  PSYCHOTIC Verified 11/30/18 16:59


 


haloperidol [From Haldol] AdvReac  HANDS Verified 11/30/18 16:59





   SWELL /  





   PSYCHOTIC  


 


haloperidol lactate AdvReac  HANDS Verified 11/30/18 16:59





[From Haldol]   SWELL /  





   PSYCHOTIC  


 


olanzapine [From Zyprexa] AdvReac  PSYCHOTIC Verified 11/30/18 16:59


 


quetiapine fumarate AdvReac  PSYCHOTIC Verified 11/30/18 16:59





[From Seroquel]     











                                Home Medications











 Medication  Instructions  Recorded  Confirmed  Last Taken  Type


 


amLODIPine 10 mg PO DAILY 11/12/20 02/19/21 02/16/21 07:00 History


 


Divalproex ER [Depakote ER] 500 mg PO BID #60 tablet 11/14/20 02/19/21 02/16/21 

07:00 Rx


 


Venlafaxine [Effexor] 300 mg PO DAILY #30 tablet 11/14/20 02/19/21 02/16/21 

07:00 Rx


 


buPROPion [Wellbutrin] 150 mg PO DAILY #30 11/14/20 02/19/21 02/16/21 07:00 Rx


 


clonazePAM [KlonoPIN] 0.5 mg PO BID #30 tablet 11/14/20 02/19/21 02/16/21 07:00 

Rx


 


Gabapentin 900 mg PO TID 02/16/21 02/19/21 02/16/21 07:00 History


 


Lisinopril 20 mg PO DAILY 02/16/21 02/19/21 02/16/21 07:00 History


 


Topiramate 60 mg PO BID 02/16/21 02/19/21 02/16/21 07:00 History


 


amLODIPine 20 mg PO DAILY 02/16/21 02/19/21 02/16/21 07:00 History


 


hydrOXYzine PAMOATE 50 mg PO TID PRN 02/16/21 02/19/21 02/16/21 07:00 History


 


traMADoL 50 mg PO QID PRN 02/16/21 02/19/21 02/16/21 07:00 History


 


Paliperidone [Invega] 9 mg PO QHS 02/17/21 02/19/21 Unknown History


 


traZODone 150 mg PO DAILY PRN 02/17/21 02/19/21 02/16/21 19:00 History











Active Meds: 


Active Medications





Amlodipine Besylate (Amlodipine 10 Mg Tab)  10 mg PO DAILY [facility]


   Last Admin: 02/19/21 10:13 Dose:  Not Given


   Documented by: 


Divalproex Sodium (Divalproex Er 500 Mg Tab)  500 mg PO BID [facility]


   Last Admin: 02/19/21 21:03 Dose:  500 mg


   Documented by: 


Hydroxyzine Pamoate (Hydroxyzine Pamoate 50 Mg Cap)  50 mg PO TID PRN


   PRN Reason: Anxiety


Levofloxacin (Levofloxacin 500 Mg Tab)  500 mg PO Q24HR [facility]; Protocol


   Stop: 02/21/21 21:42


   Last Admin: 02/19/21 10:08 Dose:  500 mg


   Documented by: 


Lisinopril (Lisinopril 20 Mg Tab)  20 mg PO QDAY [facility]


   Last Admin: 02/19/21 10:25 Dose:  Not Given


   Documented by: 


Nicotine (Nicotine 21 Mg/24 Hr Patch)  21 mg TD QDAY [facility]


   Last Admin: 02/19/21 10:11 Dose:  Not Given


   Documented by: 


Paliperidone (Paliperidone Er 3 Mg Tab)  6 mg PO QDAY [facility]


   Last Admin: 02/19/21 14:54 Dose:  6 mg


   Documented by: 


Topiramate (Topiramate Tab 25 Mg Tab)  25 mg PO BID [facility]


   Last Admin: 02/19/21 21:03 Dose:  25 mg


   Documented by: 


Trazodone HCl (Trazodone 50 Mg Tab)  150 mg PO QHS PRN


   PRN Reason: Sleep


   Last Admin: 02/17/21 21:33 Dose:  150 mg


   Documented by: 


Venlafaxine HCl (Venlafaxine 75 Mg Tab)  300 mg PO DAILY [facility]


   Last Admin: 02/19/21 10:22 Dose:  300 mg


   Documented by: 











Results





- Results


Labs/Vitals: 


                             Laboratory Last Values











Creatinine  1.0 mg/dL (0.8-1.3)   02/18/21  06:26    


 


Estimated GFR  > 60 ml/min  02/18/21  06:26    


 


POC Glucose  83 mg/dL ()   02/18/21  06:48    


 


Hemoglobin A1c  5.8 % (4-6)   02/17/21  18:49    


 


Triglycerides  140 mg/dL (2-149)   02/17/21  18:49    


 


Cholesterol  171 mg/dL ()   02/17/21  18:49    


 


LDL Cholesterol Direct  115 mg/dL ()   02/17/21  18:49    


 


HDL Cholesterol  46 mg/dL (40-59)   02/17/21  18:49    


 


Cholesterol/HDL Ratio  3.71 %  02/17/21  18:49    


 


TSH  1.840 mlU/mL (0.270-4.200)   02/17/21  18:49    








                                Last Vital Signs











Temp  98.0 F   02/19/21 22:00


 


Pulse  91 H  02/19/21 22:00


 


Resp  15   02/19/21 22:00


 


BP  105/63   02/19/21 22:00


 


Pulse Ox  99   02/19/21 22:00

## 2021-02-21 NOTE — PROGRESS NOTE
Subjective


Date of service: 02/21/21


Principal diagnosis: Bipolar disorder


Subjective Comment: 


Psych Nurse: Received patient in bed awake, aox3, he respond to greeting, pt 

verbalized sleeping well, he said he want to go home, staff reassured pt that 

the doctor will be here to see him, he denies si/hi and avh at present. Will 

continue to monitor.





Psych progress 


Patient describes a good and stable mood, denies being depressed or excessively 

nervous. Patient eats and sleeps well. Patient denies panic attacks, recurrent 

nightmares or flashbacks. Patient denies symptoms suggestive of OCD or PTSD. 

Patient denies hallucinations, paranoia, thought interference and no features 

suggestive of hypomania or raven. Patiently completely denies suicidal or 

homicidal thoughts.





Reason for inpatient admission:  Planning for safety discharge. 





REVIEW OF SYSTEMS


Constitutional: Negative for weight loss


ENT: Negative for stridor


Respiratory: Negative for cough or hemoptysis


All other systems reviewed and are negative





MENTAL STATUS EXAMINATION


General Appearance and Behavior: Age appropriate, good hygiene, wearing 

appropriate clothes, good eye contact, cooperative polite with questioning.


Cooperation: Participating/engaged


Psychomotor Behavior:  unremarkable and within normal limits


Mood: not good, anxious


Affect and affective range: congruent with mood


Thought Process: ilogical, 


Thought Content: paranoid


Speech: Normal volume, Regular rate and rhythm, 


Intellectual Functioning: Average


Suicidal Ideation:  deneis


Homicidal Ideation: Denies HI


Impulse Control: Unimpaired


Insight and Judgment: Normal insight and judgment,


Memory: Normal, 


Attention: Normal,


Orientation: Alert, oriented,





 Assessment and Plan 





- Psychiatric problem


(1) Schizoaffective disorder, bipolar type


Current Visit: Yes   Status: Acute   





(2) Major depressive disorder, severe


Current Visit: Yes   Status: Acute   





(3) Substance use disorder


Current Visit: Yes   Status: Acute   





Treatment Plan


Continue current medication


  Patient admitted for inpatient psychiatric evaluation, medication adjustment 

and close monitoring


 The patient's behavior, mood, sleep and appetite will be closely monitored.


 Patient enrolled in individual and group therapeutic sessions and encouraged to

attend.


 Patient provided with a safe and structured environment.


 Patient's physical health needs will be addressed by the Hospitalist. 

Hospitalist Consulted


 Labs including CBC, CMP, Lipid profile and Hemoglobin A1C levels ordered for 

baseline reference


 Social Assessment will be completed and the  will work with 

patient and family to ensure a suitable and safe disposition


 Medication adjustment will be made as clinically indicated


 Usual Wellness Restoration/Preservation:


 - Start Trazodone 50 mg po QHS & 50 mg po QHS PRN between 10 PM & 2 AM for 

insomnia


 - Start Melatonin 5 mg po QHS to promote circadian rhythm


 - Start Omega-3 for brain health, reduce impulsivity, and as adjunctive 

treatment for mood disorder, continue upon discharge given overall benefits.


 - Start B1 prophylaxis with 200 mg po for 5 days


 The patient agreed on the treatment plan, understood the risk, benefit, 

alternative treatment, potential consequence of no treatment, and gave informed 

consent.


Initial Certification





 Inpatient psych services:





 I certify that the inpatient psychiatric services are required for treatment 

that could reasonably be expected to improve the patient's condition.


 Estimated days: 4


 Post hospital care: primary care provider, psychiatric provider





Medications and Allergies


                                    Allergies











Allergy/AdvReac Type Severity Reaction Status Date / Time


 


buspirone HCl [From BuSpar] AdvReac  PSYCHOTIC Verified 11/30/18 16:59


 


haloperidol [From Haldol] AdvReac  HANDS Verified 11/30/18 16:59





   SWELL /  





   PSYCHOTIC  


 


haloperidol lactate AdvReac  HANDS Verified 11/30/18 16:59





[From Haldol]   SWELL /  





   PSYCHOTIC  


 


olanzapine [From Zyprexa] AdvReac  PSYCHOTIC Verified 11/30/18 16:59


 


quetiapine fumarate AdvReac  PSYCHOTIC Verified 11/30/18 16:59





[From Seroquel]     











                                Home Medications











 Medication  Instructions  Recorded  Confirmed  Last Taken  Type


 


amLODIPine 10 mg PO DAILY 11/12/20 02/19/21 02/16/21 07:00 History


 


Divalproex ER [Depakote ER] 500 mg PO BID #60 tablet 11/14/20 02/19/21 02/16/21 

07:00 Rx


 


Venlafaxine [Effexor] 300 mg PO DAILY #30 tablet 11/14/20 02/19/21 02/16/21 

07:00 Rx


 


buPROPion [Wellbutrin] 150 mg PO DAILY #30 11/14/20 02/19/21 02/16/21 07:00 Rx


 


clonazePAM [KlonoPIN] 0.5 mg PO BID #30 tablet 11/14/20 02/19/21 02/16/21 07:00 

Rx


 


Gabapentin 900 mg PO TID 02/16/21 02/19/21 02/16/21 07:00 History


 


Lisinopril 20 mg PO DAILY 02/16/21 02/19/21 02/16/21 07:00 History


 


Topiramate 60 mg PO BID 02/16/21 02/19/21 02/16/21 07:00 History


 


amLODIPine 20 mg PO DAILY 02/16/21 02/19/21 02/16/21 07:00 History


 


hydrOXYzine PAMOATE 50 mg PO TID PRN 02/16/21 02/19/21 02/16/21 07:00 History


 


traMADoL 50 mg PO QID PRN 02/16/21 02/19/21 02/16/21 07:00 History


 


Paliperidone [Invega] 9 mg PO QHS 02/17/21 02/19/21 Unknown History


 


traZODone 150 mg PO DAILY PRN 02/17/21 02/19/21 02/16/21 19:00 History











Active Meds: 


Active Medications





Amlodipine Besylate (Amlodipine 10 Mg Tab)  10 mg PO DAILY [facility]


   Last Admin: 02/20/21 09:26 Dose:  Not Given


   Documented by: 


Clonazepam (Clonazepam 0.5 Mg Tab)  0.5 mg PO TID [facility]


   Last Admin: 02/21/21 08:00 Dose:  0.5 mg


   Documented by: 


Divalproex Sodium (Divalproex Er 500 Mg Tab)  500 mg PO BID [facility]


   Last Admin: 02/20/21 23:00 Dose:  500 mg


   Documented by: 


Hydroxyzine Pamoate (Hydroxyzine Pamoate 50 Mg Cap)  50 mg PO TID PRN


   PRN Reason: Anxiety


Levofloxacin (Levofloxacin 500 Mg Tab)  500 mg PO Q24HR [facility]; Protocol


   Stop: 02/21/21 21:42


   Last Admin: 02/20/21 09:27 Dose:  500 mg


   Documented by: 


Lisinopril (Lisinopril 20 Mg Tab)  20 mg PO QDAY [facility]


   Last Admin: 02/20/21 09:27 Dose:  Not Given


   Documented by: 


Nicotine (Nicotine 21 Mg/24 Hr Patch)  21 mg TD QDAY [facility]


   Last Admin: 02/20/21 09:34 Dose:  Not Given


   Documented by: 


Paliperidone (Paliperidone Er 3 Mg Tab)  6 mg PO QDAY [facility]


   Last Admin: 02/20/21 09:28 Dose:  6 mg


   Documented by: 


Topiramate (Topiramate Tab 25 Mg Tab)  25 mg PO BID [facility]


   Last Admin: 02/20/21 23:00 Dose:  25 mg


   Documented by: 


Trazodone HCl (Trazodone 50 Mg Tab)  150 mg PO QHS PRN


   PRN Reason: Sleep


   Last Admin: 02/17/21 21:33 Dose:  150 mg


   Documented by: 


Venlafaxine HCl (Venlafaxine 75 Mg Tab)  300 mg PO DAILY [facility]


   Last Admin: 02/20/21 09:26 Dose:  300 mg


   Documented by: 











Results





- Results


Labs/Vitals: 


                             Laboratory Last Values











Creatinine  1.0 mg/dL (0.8-1.3)   02/18/21  06:26    


 


Estimated GFR  > 60 ml/min  02/18/21  06:26    


 


POC Glucose  83 mg/dL ()   02/18/21  06:48    


 


Hemoglobin A1c  5.8 % (4-6)   02/17/21  18:49    


 


Triglycerides  140 mg/dL (2-149)   02/17/21  18:49    


 


Cholesterol  171 mg/dL ()   02/17/21  18:49    


 


LDL Cholesterol Direct  115 mg/dL ()   02/17/21  18:49    


 


HDL Cholesterol  46 mg/dL (40-59)   02/17/21  18:49    


 


Cholesterol/HDL Ratio  3.71 %  02/17/21  18:49    


 


TSH  1.840 mlU/mL (0.270-4.200)   02/17/21  18:49    








                                Last Vital Signs











Temp  97.9 F   02/21/21 08:09


 


Pulse  96 H  02/21/21 08:09


 


Resp  19   02/21/21 08:09


 


BP  130/65   02/21/21 08:09


 


Pulse Ox  99   02/21/21 08:09

## 2021-02-22 NOTE — PROGRESS NOTE
Subjective


Date of service: 02/22/21


Principal diagnosis: Bipolar disorder


Subjective Comment: 


Psych Nurse: Patient has spent the day resting in bed. He slept off and on. His 

interactions are pleasant and cooperative. He has a good appetite eating 100% of

meals. He states he feels "better". He denies si/hi/ah/vh. He has been 

medication compliant. Will continue to monitor patient for safety.





Psych progress 


Patient describes a good and stable mood, denies being depressed or excessively 

nervous. Patient eats and sleeps well. Patient denies panic attacks, recurrent 

nightmares or flashbacks. Patient denies symptoms suggestive of OCD or PTSD. 

Patient denies hallucinations, paranoia, thought interference and no features 

suggestive of hypomania or raven. Patiently completely denies suicidal or 

homicidal thoughts.





Reason for inpatient admission:  Planning for safety discharge. 





REVIEW OF SYSTEMS


Constitutional: Negative for weight loss


ENT: Negative for stridor


Respiratory: Negative for cough or hemoptysis


All other systems reviewed and are negative





MENTAL STATUS EXAMINATION


General Appearance and Behavior: Age appropriate, good hygiene, wearing 

appropriate clothes, good eye contact, cooperative polite with questioning.


Cooperation: Participating/engaged


Psychomotor Behavior:  unremarkable and within normal limits


Mood: good


Affect and affective range: congruent with mood


Thought Process: logical, 


Thought Content: within reality


Speech: Normal volume, Regular rate and rhythm, 


Intellectual Functioning: Average


Suicidal Ideation:  denies


Homicidal Ideation: Denies HI


Impulse Control: Unimpaired


Insight and Judgment: Normal insight and judgment,


Memory: Normal, 


Attention: Normal,


Orientation: Alert, oriented,





 Assessment and Plan 





- Psychiatric problem


(1) Schizoaffective disorder, bipolar type


Current Visit: Yes   Status: Acute   





(2) Major depressive disorder, severe


Current Visit: Yes   Status: Acute   





(3) Substance use disorder


Current Visit: Yes   Status: Acute   





Treatment Plan


Continue current medication


  Patient admitted for inpatient psychiatric evaluation, medication adjustment 

and close monitoring


 The patient's behavior, mood, sleep and appetite will be closely monitored.


 Patient enrolled in individual and group therapeutic sessions and encouraged to

attend.


 Patient provided with a safe and structured environment.


 Patient's physical health needs will be addressed by the Hospitalist. 

Hospitalist Consulted


 Labs including CBC, CMP, Lipid profile and Hemoglobin A1C levels ordered for 

baseline reference


 Social Assessment will be completed and the  will work with 

patient and family to ensure a suitable and safe disposition


 Medication adjustment will be made as clinically indicated


 Usual Wellness Restoration/Preservation:


 - Start Trazodone 50 mg po QHS & 50 mg po QHS PRN between 10 PM & 2 AM for 

insomnia


 - Start Melatonin 5 mg po QHS to promote circadian rhythm


 - Start Omega-3 for brain health, reduce impulsivity, and as adjunctive 

treatment for mood disorder, continue upon discharge given overall benefits.


 - Start B1 prophylaxis with 200 mg po for 5 days


 The patient agreed on the treatment plan, understood the risk, benefit, 

alternative treatment, potential consequence of no treatment, and gave informed 

consent.


Initial Certification





 Inpatient psych services:





 I certify that the inpatient psychiatric services are required for treatment 

that could reasonably be expected to improve the patient's condition.


 Estimated days: 4


 Post hospital care: primary care provider, psychiatric provider





Medications and Allergies


                                    Allergies











Allergy/AdvReac Type Severity Reaction Status Date / Time


 


buspirone HCl [From BuSpar] AdvReac  PSYCHOTIC Verified 11/30/18 16:59


 


haloperidol [From Haldol] AdvReac  HANDS Verified 11/30/18 16:59





   SWELL /  





   PSYCHOTIC  


 


haloperidol lactate AdvReac  HANDS Verified 11/30/18 16:59





[From Haldol]   SWELL /  





   PSYCHOTIC  


 


olanzapine [From Zyprexa] AdvReac  PSYCHOTIC Verified 11/30/18 16:59


 


quetiapine fumarate AdvReac  PSYCHOTIC Verified 11/30/18 16:59





[From Seroquel]     











                                Home Medications











 Medication  Instructions  Recorded  Confirmed  Last Taken  Type


 


amLODIPine 10 mg PO DAILY 11/12/20 02/19/21 02/16/21 07:00 History


 


Divalproex ER [Depakote ER] 500 mg PO BID #60 tablet 11/14/20 02/19/21 02/16/21 

07:00 Rx


 


Venlafaxine [Effexor] 300 mg PO DAILY #30 tablet 11/14/20 02/19/21 02/16/21 

07:00 Rx


 


buPROPion [Wellbutrin] 150 mg PO DAILY #30 11/14/20 02/19/21 02/16/21 07:00 Rx


 


clonazePAM [KlonoPIN] 0.5 mg PO BID #30 tablet 11/14/20 02/19/21 02/16/21 07:00 

Rx


 


Gabapentin 900 mg PO TID 02/16/21 02/19/21 02/16/21 07:00 History


 


Lisinopril 20 mg PO DAILY 02/16/21 02/19/21 02/16/21 07:00 History


 


Topiramate 60 mg PO BID 02/16/21 02/19/21 02/16/21 07:00 History


 


amLODIPine 20 mg PO DAILY 02/16/21 02/19/21 02/16/21 07:00 History


 


hydrOXYzine PAMOATE 50 mg PO TID PRN 02/16/21 02/19/21 02/16/21 07:00 History


 


traMADoL 50 mg PO QID PRN 02/16/21 02/19/21 02/16/21 07:00 History


 


Paliperidone [Invega] 9 mg PO QHS 02/17/21 02/19/21 Unknown History


 


traZODone 150 mg PO DAILY PRN 02/17/21 02/19/21 02/16/21 19:00 History











Active Meds: 


Active Medications





Amlodipine Besylate (Amlodipine 10 Mg Tab)  10 mg PO DAILY [facility]


   Last Admin: 02/21/21 09:25 Dose:  10 mg


   Documented by: 


Clonazepam (Clonazepam 0.5 Mg Tab)  0.5 mg PO TID [facility]


   Last Admin: 02/21/21 21:18 Dose:  0.5 mg


   Documented by: 


Divalproex Sodium (Divalproex Er 500 Mg Tab)  500 mg PO BID [facility]


   Last Admin: 02/21/21 21:18 Dose:  500 mg


   Documented by: 


Hydroxyzine Pamoate (Hydroxyzine Pamoate 50 Mg Cap)  50 mg PO TID PRN


   PRN Reason: Anxiety


Lisinopril (Lisinopril 20 Mg Tab)  20 mg PO QDAY [facility]


   Last Admin: 02/21/21 09:25 Dose:  20 mg


   Documented by: 


Nicotine (Nicotine 21 Mg/24 Hr Patch)  21 mg TD QDAY [facility]


   Last Admin: 02/21/21 09:25 Dose:  Not Given


   Documented by: 


Paliperidone (Paliperidone Er 3 Mg Tab)  6 mg PO QDAY [facility]


   Last Admin: 02/21/21 09:24 Dose:  6 mg


   Documented by: 


Topiramate (Topiramate Tab 25 Mg Tab)  25 mg PO BID [facility]


   Last Admin: 02/21/21 21:18 Dose:  25 mg


   Documented by: 


Trazodone HCl (Trazodone 50 Mg Tab)  150 mg PO QHS PRN


   PRN Reason: Sleep


   Last Admin: 02/17/21 21:33 Dose:  150 mg


   Documented by: 


Venlafaxine HCl (Venlafaxine 75 Mg Tab)  300 mg PO DAILY [facility]


   Last Admin: 02/21/21 09:25 Dose:  300 mg


   Documented by: 











Results





- Results


Labs/Vitals: 


                             Laboratory Last Values











Creatinine  1.0 mg/dL (0.8-1.3)   02/18/21  06:26    


 


Estimated GFR  > 60 ml/min  02/18/21  06:26    


 


POC Glucose  83 mg/dL ()   02/18/21  06:48    


 


Hemoglobin A1c  5.8 % (4-6)   02/17/21  18:49    


 


Triglycerides  140 mg/dL (2-149)   02/17/21  18:49    


 


Cholesterol  171 mg/dL ()   02/17/21  18:49    


 


LDL Cholesterol Direct  115 mg/dL ()   02/17/21  18:49    


 


HDL Cholesterol  46 mg/dL (40-59)   02/17/21  18:49    


 


Cholesterol/HDL Ratio  3.71 %  02/17/21  18:49    


 


TSH  1.840 mlU/mL (0.270-4.200)   02/17/21  18:49    








                                Last Vital Signs











Temp  98.5 F   02/21/21 19:41


 


Pulse  80   02/21/21 19:41


 


Resp  18   02/21/21 19:41


 


BP  125/67   02/21/21 19:41


 


Pulse Ox  99   02/21/21 08:09

## 2021-02-22 NOTE — DISCHARGE SUMMARY
Providers





- Providers


Date of Admission: 


02/17/21 18:02





Date of discharge: 02/22/21


Attending physician: 


KACIE BEATTY MD





                                        





02/17/21 17:00


Consult to Physician [CONS] Routine 


   Comment: 


   Consulting Provider: CHONG BANUELOS


   Physician Instructions: 


   Reason For Exam: manage medical conditions











Primary care physician: 


NAPOLEON BRIGGS MD








Hospitalization


Reason for admission: Danger to self


Condition: Good


Hospital course: 





 The patient was provided inpatient psychiatric treatment with safe and 

supportive environment, group/individual therapy, psychiatric medication, 

medication adjustment, adverse effect monitor, medical evaluation, medical 

treatment, social service assessment, social support meeting, placement as

sessment and psycho-education. The patients mood, cognition, behavior, 

motivation, compliance to treatment and appreciation on family/social support 

are improved and stabilized. At the time of discharge, the patient had no 

suicidal ideas, no homicidal ideas, no aggressive thoughts, no endangering 

behavior and no debilitating adverse effects.  The patient agareed on the 

treatment plan, understood the risk, benefit, alternative treatment, potential 

consequence of no treatment, and gave informed consent.


Disposition: DC-01 TO HOME OR SELFCARE


Allergies/Adverse Reactions: 


                                    Allergies





buspirone HCl [From BuSpar] Adverse Reaction (Verified 11/30/18 16:59)


   PSYCHOTIC


haloperidol [From Haldol] Adverse Reaction (Verified 11/30/18 16:59)


   HANDS SWELL / PSYCHOTIC


haloperidol lactate [From Haldol] Adverse Reaction (Verified 11/30/18 16:59)


   HANDS SWELL / PSYCHOTIC


olanzapine [From Zyprexa] Adverse Reaction (Verified 11/30/18 16:59)


   PSYCHOTIC


quetiapine fumarate [From Seroquel] Adverse Reaction (Verified 11/30/18 16:59)


   PSYCHOTIC








Vital Signs: 


                                Last Vital Signs











Temp  97.7 F   02/22/21 10:34


 


Pulse  94 H  02/22/21 10:34


 


Resp  18   02/22/21 10:34


 


BP  136/76   02/22/21 10:34


 


Pulse Ox  99   02/22/21 10:34











Last Lab: 


                             Laboratory Last Values











Creatinine  1.0 mg/dL (0.8-1.3)   02/18/21  06:26    


 


Estimated GFR  > 60 ml/min  02/18/21  06:26    


 


POC Glucose  83 mg/dL ()   02/18/21  06:48    


 


Hemoglobin A1c  5.8 % (4-6)   02/17/21  18:49    


 


Triglycerides  140 mg/dL (2-149)   02/17/21  18:49    


 


Cholesterol  171 mg/dL ()   02/17/21  18:49    


 


LDL Cholesterol Direct  115 mg/dL ()   02/17/21  18:49    


 


HDL Cholesterol  46 mg/dL (40-59)   02/17/21  18:49    


 


Cholesterol/HDL Ratio  3.71 %  02/17/21  18:49    


 


TSH  1.840 mlU/mL (0.270-4.200)   02/17/21  18:49    














Core Measure Documentation





- Palliative Care


Palliative Care/ Comfort Measures: Not Applicable





- Core Measures


Any of the following diagnoses?: none





Exam





- Constitutional


Vitals: 


                                        











Temp Pulse Resp BP Pulse Ox


 


 97.7 F   94 H  18   136/76   99 


 


 02/22/21 10:34  02/22/21 10:34  02/22/21 10:34  02/22/21 10:34  02/22/21 10:34











General appearance: Present: no acute distress





- EENT


Eyes: Present: PERRL, EOM intact


ENT: hearing intact, clear oral mucosa, dentition normal





- Neck


Neck: Present: supple, normal ROM





- Respiratory


Respiratory effort: normal





- Abdominal


Male genitourinary: Present: deferred





- Integumentary


Integumentary: Present: clear, warm, dry





Plan


Activity: no restrictions


Care Plan Goals: 





 Maintain good and stable mental health.





 Plan of Treatment:





 The patient should be compliant with medications, not to use drugs and not to 

drink alcohol.  





 The patient understands that if suicidal ideas, homicidal ideas, or any 

endangering thoughts arise, the patient should immediately seek for emergent 

assistance including but not limited to crisis hot line and emergency room.





 Follow up with outpatient Psychiatrist and PCP within 7 - 14 days of discharge.


Follow up with: 


NAPOLEON BRIGGS MD [Primary Care Provider] - 7 Days


Prescriptions: 


Divalproex ER [Depakote ER] 500 mg PO BID #60 tablet


Venlafaxine [Effexor] 300 mg PO DAILY #30 tablet


Paliperidone [Invega] 6 mg PO QDAY #30 tablet